# Patient Record
Sex: MALE | Race: WHITE | NOT HISPANIC OR LATINO | Employment: OTHER | URBAN - METROPOLITAN AREA
[De-identification: names, ages, dates, MRNs, and addresses within clinical notes are randomized per-mention and may not be internally consistent; named-entity substitution may affect disease eponyms.]

---

## 2023-09-30 PROBLEM — R33.9 URINARY RETENTION: Status: ACTIVE | Noted: 2023-09-30

## 2023-09-30 PROBLEM — N40.1 BENIGN LOCALIZED HYPERPLASIA OF PROSTATE WITH URINARY RETENTION: Status: ACTIVE | Noted: 2023-09-30

## 2023-09-30 PROBLEM — C61 PROSTATE CANCER (MULTI): Status: ACTIVE | Noted: 2023-09-30

## 2023-09-30 PROBLEM — R33.8 BENIGN LOCALIZED HYPERPLASIA OF PROSTATE WITH URINARY RETENTION: Status: ACTIVE | Noted: 2023-09-30

## 2023-09-30 RX ORDER — LIDOCAINE 50 MG/G
OINTMENT TOPICAL
COMMUNITY
Start: 2022-02-25

## 2023-09-30 RX ORDER — METHOCARBAMOL 500 MG/1
2 TABLET, FILM COATED ORAL 4 TIMES DAILY
COMMUNITY
Start: 2022-01-03

## 2023-09-30 RX ORDER — INDAPAMIDE 1.25 MG/1
1 TABLET ORAL
COMMUNITY
Start: 2021-12-02

## 2023-09-30 RX ORDER — FLUOXETINE HYDROCHLORIDE 20 MG/1
1 CAPSULE ORAL DAILY
COMMUNITY
Start: 2021-06-04

## 2023-09-30 RX ORDER — ACETAMINOPHEN AND CODEINE PHOSPHATE 300; 30 MG/1; MG/1
1 TABLET ORAL EVERY 4 HOURS PRN
COMMUNITY
Start: 2022-02-23

## 2023-09-30 RX ORDER — OMEPRAZOLE 40 MG/1
40 CAPSULE, DELAYED RELEASE ORAL
COMMUNITY

## 2023-09-30 RX ORDER — CARVEDILOL 3.12 MG/1
1 TABLET ORAL 2 TIMES DAILY
COMMUNITY
Start: 2022-02-24

## 2023-09-30 RX ORDER — FLUTICASONE PROPIONATE 50 MCG
1 SPRAY, SUSPENSION (ML) NASAL DAILY
COMMUNITY
Start: 2021-12-21

## 2023-09-30 RX ORDER — QUETIAPINE FUMARATE 25 MG/1
25 TABLET, FILM COATED ORAL NIGHTLY PRN
COMMUNITY
Start: 2022-01-03

## 2023-09-30 RX ORDER — TIMOLOL MALEATE 5 MG/ML
1 SOLUTION/ DROPS OPHTHALMIC 2 TIMES DAILY
COMMUNITY
Start: 2021-02-25

## 2023-09-30 RX ORDER — LISINOPRIL 20 MG/1
1 TABLET ORAL 2 TIMES DAILY
COMMUNITY
Start: 2022-01-31

## 2023-09-30 RX ORDER — LORATADINE 10 MG/1
1 TABLET ORAL DAILY
COMMUNITY
Start: 2021-07-14

## 2023-09-30 RX ORDER — IPRATROPIUM BROMIDE 42 UG/1
SPRAY, METERED NASAL
COMMUNITY
Start: 2022-03-10

## 2023-09-30 RX ORDER — TIZANIDINE 4 MG/1
1-2 TABLET ORAL EVERY 8 HOURS
COMMUNITY
Start: 2021-12-16

## 2023-09-30 RX ORDER — TEMAZEPAM 7.5 MG/1
1 CAPSULE ORAL NIGHTLY
COMMUNITY
Start: 2021-05-21

## 2023-09-30 RX ORDER — TIMOLOL 5 MG/ML
1 SOLUTION/ DROPS OPHTHALMIC 2 TIMES DAILY
COMMUNITY

## 2023-09-30 RX ORDER — QUETIAPINE FUMARATE 50 MG/1
1 TABLET, FILM COATED ORAL NIGHTLY
COMMUNITY
Start: 2022-02-09

## 2023-09-30 RX ORDER — LIDOCAINE HYDROCHLORIDE 20 MG/ML
JELLY TOPICAL
COMMUNITY
Start: 2022-04-12

## 2023-09-30 RX ORDER — CETIRIZINE HYDROCHLORIDE, PSEUDOEPHEDRINE HYDROCHLORIDE 5; 120 MG/1; MG/1
1 TABLET, FILM COATED, EXTENDED RELEASE ORAL 2 TIMES DAILY
COMMUNITY
Start: 2021-12-21

## 2023-09-30 RX ORDER — ROSUVASTATIN CALCIUM 10 MG/1
1 TABLET, COATED ORAL EVERY OTHER DAY
COMMUNITY
Start: 2021-06-17

## 2023-09-30 RX ORDER — ROPINIROLE 0.25 MG/1
0.5 TABLET, FILM COATED ORAL NIGHTLY
COMMUNITY
Start: 2021-05-07

## 2023-09-30 RX ORDER — ESCITALOPRAM OXALATE 5 MG/1
1 TABLET ORAL DAILY
COMMUNITY
Start: 2022-01-03

## 2023-09-30 RX ORDER — TAMSULOSIN HYDROCHLORIDE 0.4 MG/1
0.4 CAPSULE ORAL EVERY 12 HOURS
COMMUNITY
Start: 2022-03-24

## 2023-10-04 ENCOUNTER — APPOINTMENT (OUTPATIENT)
Dept: UROLOGY | Facility: CLINIC | Age: 68
End: 2023-10-04
Payer: MEDICAID

## 2023-11-01 ENCOUNTER — APPOINTMENT (OUTPATIENT)
Dept: UROLOGY | Facility: CLINIC | Age: 68
End: 2023-11-01
Payer: MEDICAID

## 2023-12-15 ENCOUNTER — APPOINTMENT (OUTPATIENT)
Dept: UROLOGY | Facility: CLINIC | Age: 68
End: 2023-12-15
Payer: MEDICAID

## 2024-04-01 ENCOUNTER — APPOINTMENT (OUTPATIENT)
Dept: RADIOLOGY | Facility: CLINIC | Age: 69
End: 2024-04-01
Payer: MEDICARE

## 2024-04-01 ENCOUNTER — APPOINTMENT (OUTPATIENT)
Dept: ORTHOPEDIC SURGERY | Facility: CLINIC | Age: 69
End: 2024-04-01
Payer: MEDICARE

## 2024-04-15 ENCOUNTER — TELEMEDICINE (OUTPATIENT)
Dept: UROLOGY | Facility: CLINIC | Age: 69
End: 2024-04-15
Payer: MEDICARE

## 2024-04-15 ENCOUNTER — APPOINTMENT (OUTPATIENT)
Dept: UROLOGY | Facility: CLINIC | Age: 69
End: 2024-04-15
Payer: MEDICARE

## 2024-04-15 DIAGNOSIS — Z79.2 NEED FOR PROPHYLACTIC ANTIBIOTIC: ICD-10-CM

## 2024-04-15 DIAGNOSIS — R33.8 BENIGN LOCALIZED HYPERPLASIA OF PROSTATE WITH URINARY RETENTION: Primary | ICD-10-CM

## 2024-04-15 DIAGNOSIS — N40.1 BENIGN LOCALIZED HYPERPLASIA OF PROSTATE WITH URINARY RETENTION: Primary | ICD-10-CM

## 2024-04-15 PROCEDURE — 99214 OFFICE O/P EST MOD 30 MIN: CPT | Performed by: UROLOGY

## 2024-04-15 PROCEDURE — G2211 COMPLEX E/M VISIT ADD ON: HCPCS | Performed by: UROLOGY

## 2024-04-15 RX ORDER — CEPHALEXIN 500 MG/1
500 CAPSULE ORAL ONCE
Qty: 1 CAPSULE | Refills: 0 | Status: SHIPPED | OUTPATIENT
Start: 2024-04-15 | End: 2024-04-15

## 2024-04-15 NOTE — PROGRESS NOTES
Subjective     This visit was completed via telemedicine. All issues as below were discussed and addressed but no physical exam was performed unless allowed by visual confirmation. If it was felt that the patient should be evaluated in clinic, then they were directed there. Patient verbally consented to visit.    Paul Malone is a 68 y.o. male with a history of low-grade prostate cancer and BPH s/p HoLEP 05/05/2022. Presenting today for a follow up visit. Patient reports post-void dribbling, occasional urinary hesitancy and occasional intermittent urinary stream. He denies any gross hematuria, dysuria, nausea, vomiting, fevers or chills.         Past Medical History:   Diagnosis Date    Presence of other specified devices 04/12/2022    Chronic indwelling Seo catheter     No past surgical history on file.  No family history on file.  Current Outpatient Medications   Medication Sig Dispense Refill    acetaminophen-codeine (Tylenol w/ Codeine #3) 300-30 mg tablet Take 1 tablet by mouth every 4 hours if needed.      carvedilol (Coreg) 3.125 mg tablet Take 1 tablet (3.125 mg) by mouth 2 times a day.      cetirizine-pseudoephedrine (ZyrTEC-D) 5-120 mg 12 hr tablet Take 1 tablet by mouth 2 times a day. for 14 days      escitalopram (Lexapro) 5 mg tablet Take 1 tablet (5 mg) by mouth once daily.      FLUoxetine (PROzac) 20 mg capsule Take 1 capsule (20 mg) by mouth once daily.      fluticasone (Flonase) 50 mcg/actuation nasal spray Administer 1 spray into each nostril once daily.      indapamide (Lozol) 1.25 mg tablet Take 1 tablet (1.25 mg) by mouth once daily in the morning. Take before meals.      ipratropium (Atrovent) 42 mcg (0.06 %) nasal spray Administer into affected nostril(s).      lidocaine (Xylocaine) 2 % gel Apply topically.  APPLY 0.5 INCH 3 times daily PRN Apply pea size amount to meatus for any discomfort 3 times daily as needed      lidocaine (Xylocaine) 5 % ointment Lidocaine 5 % External Ointment    Quantity: 50  Refills: 0        Start : 25-Feb-2022   Active      linaCLOtide (Linzess) 290 mcg capsule Take 1 capsule (290 mcg) by mouth once daily in the morning. Take before meals.      lisinopril 20 mg tablet Take 1 tablet (20 mg) by mouth 2 times a day.      loratadine (Claritin) 10 mg tablet Take 1 tablet (10 mg) by mouth once daily.      methocarbamol (Robaxin) 500 mg tablet Take 2 tablets (1,000 mg) by mouth 4 times a day. for 5 days      omeprazole (PriLOSEC) 40 mg DR capsule Take 1 capsule (40 mg) by mouth once daily in the morning. Take before meals. Do not crush or chew.      pseudoephedrine-DM-guaifenesin 60- mg tablet Take 1 tablet by mouth 3 times a day as needed.  if needed for congestion      QUEtiapine (SEROquel) 25 mg tablet Take 1 tablet (25 mg) by mouth as needed at bedtime.      QUEtiapine (SEROquel) 50 mg tablet Take 1 tablet (50 mg) by mouth once daily at bedtime.      rOPINIRole (Requip) 0.25 mg tablet Take 2 tablets (0.5 mg) by mouth once daily at bedtime.  1 TO 3 HOURS BEFORE BEDTIME if needed      rosuvastatin (Crestor) 10 mg tablet Take 1 tablet (10 mg) by mouth every other day.      tamsulosin (Flomax) 0.4 mg 24 hr capsule Take 1 capsule (0.4 mg) by mouth every 12 hours.      temazepam (Restoril) 7.5 mg capsule Take 1 capsule (7.5 mg) by mouth once daily at bedtime.      timolol (Timoptic) 0.5 % ophthalmic solution Administer 1 drop into affected eye(s) 2 times a day.      timoloL maleate, PF, 0.5 % dropperette Administer 1 drop into affected eye(s) twice a day.      tiZANidine (Zanaflex) 4 mg tablet Take 1-2 tablets (4-8 mg) by mouth every 8 hours. for 10 days       No current facility-administered medications for this visit.     No Known Allergies  Social History     Socioeconomic History    Marital status:      Spouse name: Not on file    Number of children: Not on file    Years of education: Not on file    Highest education level: Not on file   Occupational History     Not on file   Tobacco Use    Smoking status: Not on file    Smokeless tobacco: Not on file   Substance and Sexual Activity    Alcohol use: Not on file    Drug use: Not on file    Sexual activity: Not on file   Other Topics Concern    Not on file   Social History Narrative    Not on file     Social Determinants of Health     Financial Resource Strain: Not on file   Food Insecurity: Not on file   Transportation Needs: Not on file   Physical Activity: Not on file   Stress: Not on file   Social Connections: Not on file   Intimate Partner Violence: Not on file   Housing Stability: Not on file       Review of Systems  Pertinent items are noted in HPI.    Objective       Lab Review  Lab Results   Component Value Date    WBC 10.4 04/27/2022    RBC 4.71 04/27/2022    HGB 15.1 04/27/2022    HCT 43.9 04/27/2022     04/27/2022      Lab Results   Component Value Date    BUN 12 04/27/2022    CREATININE 0.81 04/27/2022       Assessment/Plan   Diagnoses and all orders for this visit:  Benign localized hyperplasia of prostate with urinary retention  Need for prophylactic antibiotic    BPH with LUTS     I recommend proceeding with cystoscopy to rule out BNC.     Patient reports he currently does not have a drivers licence and has to wait for his brother. He will call to schedule cystoscopy.     The risks of cystoscopy were discussed with the patient in great detail, including risk of hematuria, UTI and discomfort. Antibiotic will be prescribed to be taken 1 hour prior to the procedure.       All questions were answered to the patient's satisfaction. Patient agrees with the plan and wishes to proceed. Follow-up will be scheduled appropriately.     Scribed for Dr. Medrano by Kassandra Sanchez. I , Dr Medrano, have personally reviewed and agreed with the information entered by the Virtual Scribe.

## 2024-05-14 ENCOUNTER — APPOINTMENT (OUTPATIENT)
Dept: UROLOGY | Facility: CLINIC | Age: 69
End: 2024-05-14
Payer: MEDICARE

## 2024-08-27 ENCOUNTER — APPOINTMENT (OUTPATIENT)
Dept: UROLOGY | Facility: CLINIC | Age: 69
End: 2024-08-27
Payer: MEDICARE

## 2024-08-27 DIAGNOSIS — N40.1 BENIGN LOCALIZED HYPERPLASIA OF PROSTATE WITH URINARY RETENTION: ICD-10-CM

## 2024-08-27 DIAGNOSIS — C61 PROSTATE CANCER (MULTI): ICD-10-CM

## 2024-08-27 DIAGNOSIS — R33.8 BENIGN LOCALIZED HYPERPLASIA OF PROSTATE WITH URINARY RETENTION: ICD-10-CM

## 2024-08-27 DIAGNOSIS — Z79.2 NEED FOR PROPHYLACTIC ANTIBIOTIC: ICD-10-CM

## 2024-08-27 PROCEDURE — 99214 OFFICE O/P EST MOD 30 MIN: CPT | Performed by: UROLOGY

## 2024-08-27 PROCEDURE — G2211 COMPLEX E/M VISIT ADD ON: HCPCS | Performed by: UROLOGY

## 2024-08-27 NOTE — PROGRESS NOTES
Subjective     This visit was completed via telemedicine. All issues as below were discussed and addressed but no physical exam was performed unless allowed by visual confirmation. If it was felt that the patient should be evaluated in clinic, then they were directed there. Patient verbally consented to visit.      Paul Malone is a 69 y.o. male  with a history of low-grade prostate cancer and BPH s/p HoLEP 05/05/2022. Presenting today for a follow up visit. Patient reports post-void dribbling, occasional urinary hesitancy and occasional intermittent urinary stream. He denies any gross hematuria, dysuria, nausea, vomiting, fevers or chills.     Last PSA 1.9        Past Medical History:   Diagnosis Date    Presence of other specified devices 04/12/2022    Chronic indwelling Seo catheter     No past surgical history on file.  No family history on file.  Current Outpatient Medications   Medication Sig Dispense Refill    acetaminophen-codeine (Tylenol w/ Codeine #3) 300-30 mg tablet Take 1 tablet by mouth every 4 hours if needed.      carvedilol (Coreg) 3.125 mg tablet Take 1 tablet (3.125 mg) by mouth 2 times a day.      cetirizine-pseudoephedrine (ZyrTEC-D) 5-120 mg 12 hr tablet Take 1 tablet by mouth 2 times a day. for 14 days      escitalopram (Lexapro) 5 mg tablet Take 1 tablet (5 mg) by mouth once daily.      FLUoxetine (PROzac) 20 mg capsule Take 1 capsule (20 mg) by mouth once daily.      fluticasone (Flonase) 50 mcg/actuation nasal spray Administer 1 spray into each nostril once daily.      indapamide (Lozol) 1.25 mg tablet Take 1 tablet (1.25 mg) by mouth once daily in the morning. Take before meals.      ipratropium (Atrovent) 42 mcg (0.06 %) nasal spray Administer into affected nostril(s).      lidocaine (Xylocaine) 2 % gel Apply topically.  APPLY 0.5 INCH 3 times daily PRN Apply pea size amount to meatus for any discomfort 3 times daily as needed      lidocaine (Xylocaine) 5 % ointment Lidocaine 5 %  External Ointment   Quantity: 50  Refills: 0        Start : 25-Feb-2022   Active      linaCLOtide (Linzess) 290 mcg capsule Take 1 capsule (290 mcg) by mouth once daily in the morning. Take before meals.      lisinopril 20 mg tablet Take 1 tablet (20 mg) by mouth 2 times a day.      loratadine (Claritin) 10 mg tablet Take 1 tablet (10 mg) by mouth once daily.      methocarbamol (Robaxin) 500 mg tablet Take 2 tablets (1,000 mg) by mouth 4 times a day. for 5 days      omeprazole (PriLOSEC) 40 mg DR capsule Take 1 capsule (40 mg) by mouth once daily in the morning. Take before meals. Do not crush or chew.      pseudoephedrine-DM-guaifenesin 60- mg tablet Take 1 tablet by mouth 3 times a day as needed.  if needed for congestion      QUEtiapine (SEROquel) 25 mg tablet Take 1 tablet (25 mg) by mouth as needed at bedtime.      QUEtiapine (SEROquel) 50 mg tablet Take 1 tablet (50 mg) by mouth once daily at bedtime.      rOPINIRole (Requip) 0.25 mg tablet Take 2 tablets (0.5 mg) by mouth once daily at bedtime.  1 TO 3 HOURS BEFORE BEDTIME if needed      rosuvastatin (Crestor) 10 mg tablet Take 1 tablet (10 mg) by mouth every other day.      tamsulosin (Flomax) 0.4 mg 24 hr capsule Take 1 capsule (0.4 mg) by mouth every 12 hours.      temazepam (Restoril) 7.5 mg capsule Take 1 capsule (7.5 mg) by mouth once daily at bedtime.      timolol (Timoptic) 0.5 % ophthalmic solution Administer 1 drop into affected eye(s) 2 times a day.      timoloL maleate, PF, 0.5 % dropperette Administer 1 drop into affected eye(s) twice a day.      tiZANidine (Zanaflex) 4 mg tablet Take 1-2 tablets (4-8 mg) by mouth every 8 hours. for 10 days       No current facility-administered medications for this visit.     No Known Allergies  Social History     Socioeconomic History    Marital status:      Spouse name: Not on file    Number of children: Not on file    Years of education: Not on file    Highest education level: Not on file  "  Occupational History    Not on file   Tobacco Use    Smoking status: Not on file    Smokeless tobacco: Not on file   Substance and Sexual Activity    Alcohol use: Not on file    Drug use: Not on file    Sexual activity: Not on file   Other Topics Concern    Not on file   Social History Narrative    Not on file     Social Determinants of Health     Financial Resource Strain: Not on file   Food Insecurity: Not on file   Transportation Needs: Not on file   Physical Activity: Not on file   Stress: Not on file   Social Connections: Not on file   Intimate Partner Violence: Not on file   Housing Stability: Not on file       Review of Systems  Pertinent items are noted in HPI.    Objective       Lab Review  Lab Results   Component Value Date    WBC 10.4 04/27/2022    RBC 4.71 04/27/2022    HGB 15.1 04/27/2022    HCT 43.9 04/27/2022     04/27/2022      Lab Results   Component Value Date    BUN 12 04/27/2022    CREATININE 0.81 04/27/2022      No results found for: \"PSA\"        Assessment/Plan   There are no diagnoses linked to this encounter.  BPH with LUTS s/p HoLEP    I recommend proceeding with cystoscopy to rule out BNC.     The risks of cystoscopy were discussed with the patient in great detail, including risk of hematuria, UTI and discomfort. Antibiotic will be prescribed to be taken 1 hour prior to the procedure. Patient agrees to proceed.     Elevated PSA     PSA is 1.9, this is elevated.     We will obtain a prostate MRI to assess prostate anatomy and r/o prostate cancer.     Follow up to review.     All questions were answered to the patient's satisfaction. Patient agrees with the plan and wishes to proceed. Follow-up will be scheduled appropriately.       Scribed for Dr. Medrano by Kassandra Sanchez. I , Dr Medrano, have personally reviewed and agreed with the information entered by the Virtual Scribe.   "

## 2024-09-12 ENCOUNTER — HOSPITAL ENCOUNTER (OUTPATIENT)
Dept: RADIOLOGY | Facility: CLINIC | Age: 69
Discharge: HOME | End: 2024-09-12
Payer: COMMERCIAL

## 2024-09-12 DIAGNOSIS — C61 PROSTATE CANCER (MULTI): ICD-10-CM

## 2024-09-12 PROCEDURE — 72197 MRI PELVIS W/O & W/DYE: CPT

## 2024-09-12 PROCEDURE — A9575 INJ GADOTERATE MEGLUMI 0.1ML: HCPCS | Performed by: UROLOGY

## 2024-09-12 PROCEDURE — 2550000001 HC RX 255 CONTRASTS: Performed by: UROLOGY

## 2024-09-12 RX ORDER — GADOTERATE MEGLUMINE 376.9 MG/ML
0.2 INJECTION INTRAVENOUS
Status: COMPLETED | OUTPATIENT
Start: 2024-09-12 | End: 2024-09-12

## 2024-09-18 ENCOUNTER — TELEMEDICINE (OUTPATIENT)
Dept: UROLOGY | Facility: CLINIC | Age: 69
End: 2024-09-18
Payer: MEDICARE

## 2024-09-18 DIAGNOSIS — R33.8 BENIGN LOCALIZED HYPERPLASIA OF PROSTATE WITH URINARY RETENTION: ICD-10-CM

## 2024-09-18 DIAGNOSIS — C61 PROSTATE CANCER (MULTI): ICD-10-CM

## 2024-09-18 DIAGNOSIS — N40.1 BENIGN LOCALIZED HYPERPLASIA OF PROSTATE WITH URINARY RETENTION: ICD-10-CM

## 2024-09-18 NOTE — PROGRESS NOTES
Subjective     This visit was completed via telemedicine. All issues as below were discussed and addressed but no physical exam was performed unless allowed by visual confirmation. If it was felt that the patient should be evaluated in clinic, then they were directed there. Patient verbally consented to visit.      Paul Malone is a 69 y.o. male with a history of low-grade prostate cancer, elevated PSA and BPH s/p HoLEP 05/05/2022. Presenting today for a follow up visit to review prostate MRI. Patient has complaints of post-void dribbling, occasional urinary hesitancy and occasional intermittent urinary stream. He denies any gross hematuria, dysuria, nausea, vomiting, fevers or chills.       Past Medical History:   Diagnosis Date    Presence of other specified devices 04/12/2022    Chronic indwelling Seo catheter     No past surgical history on file.  No family history on file.  Current Outpatient Medications   Medication Sig Dispense Refill    acetaminophen-codeine (Tylenol w/ Codeine #3) 300-30 mg tablet Take 1 tablet by mouth every 4 hours if needed.      carvedilol (Coreg) 3.125 mg tablet Take 1 tablet (3.125 mg) by mouth 2 times a day.      cetirizine-pseudoephedrine (ZyrTEC-D) 5-120 mg 12 hr tablet Take 1 tablet by mouth 2 times a day. for 14 days      escitalopram (Lexapro) 5 mg tablet Take 1 tablet (5 mg) by mouth once daily.      FLUoxetine (PROzac) 20 mg capsule Take 1 capsule (20 mg) by mouth once daily.      fluticasone (Flonase) 50 mcg/actuation nasal spray Administer 1 spray into each nostril once daily.      indapamide (Lozol) 1.25 mg tablet Take 1 tablet (1.25 mg) by mouth once daily in the morning. Take before meals.      ipratropium (Atrovent) 42 mcg (0.06 %) nasal spray Administer into affected nostril(s).      lidocaine (Xylocaine) 2 % gel Apply topically.  APPLY 0.5 INCH 3 times daily PRN Apply pea size amount to meatus for any discomfort 3 times daily as needed      lidocaine (Xylocaine) 5  % ointment Lidocaine 5 % External Ointment   Quantity: 50  Refills: 0        Start : 25-Feb-2022   Active      linaCLOtide (Linzess) 290 mcg capsule Take 1 capsule (290 mcg) by mouth once daily in the morning. Take before meals.      lisinopril 20 mg tablet Take 1 tablet (20 mg) by mouth 2 times a day.      loratadine (Claritin) 10 mg tablet Take 1 tablet (10 mg) by mouth once daily.      methocarbamol (Robaxin) 500 mg tablet Take 2 tablets (1,000 mg) by mouth 4 times a day. for 5 days      omeprazole (PriLOSEC) 40 mg DR capsule Take 1 capsule (40 mg) by mouth once daily in the morning. Take before meals. Do not crush or chew.      pseudoephedrine-DM-guaifenesin 60- mg tablet Take 1 tablet by mouth 3 times a day as needed.  if needed for congestion      QUEtiapine (SEROquel) 25 mg tablet Take 1 tablet (25 mg) by mouth as needed at bedtime.      QUEtiapine (SEROquel) 50 mg tablet Take 1 tablet (50 mg) by mouth once daily at bedtime.      rOPINIRole (Requip) 0.25 mg tablet Take 2 tablets (0.5 mg) by mouth once daily at bedtime.  1 TO 3 HOURS BEFORE BEDTIME if needed      rosuvastatin (Crestor) 10 mg tablet Take 1 tablet (10 mg) by mouth every other day.      tamsulosin (Flomax) 0.4 mg 24 hr capsule Take 1 capsule (0.4 mg) by mouth every 12 hours.      temazepam (Restoril) 7.5 mg capsule Take 1 capsule (7.5 mg) by mouth once daily at bedtime.      timolol (Timoptic) 0.5 % ophthalmic solution Administer 1 drop into affected eye(s) 2 times a day.      timoloL maleate, PF, 0.5 % dropperette Administer 1 drop into affected eye(s) twice a day.      tiZANidine (Zanaflex) 4 mg tablet Take 1-2 tablets (4-8 mg) by mouth every 8 hours. for 10 days       No current facility-administered medications for this visit.     No Known Allergies  Social History     Socioeconomic History    Marital status:      Spouse name: Not on file    Number of children: Not on file    Years of education: Not on file    Highest education  "level: Not on file   Occupational History    Not on file   Tobacco Use    Smoking status: Every Day     Current packs/day: 1.00     Types: Cigarettes    Smokeless tobacco: Never   Substance and Sexual Activity    Alcohol use: Not on file    Drug use: Not on file    Sexual activity: Not on file   Other Topics Concern    Not on file   Social History Narrative    Not on file     Social Determinants of Health     Financial Resource Strain: Not on file   Food Insecurity: Not on file   Transportation Needs: Not on file   Physical Activity: Not on file   Stress: Not on file   Social Connections: Not on file   Intimate Partner Violence: Not on file   Housing Stability: Not on file       Review of Systems  Pertinent items are noted in HPI.    Objective       Lab Review  Lab Results   Component Value Date    WBC 10.4 04/27/2022    RBC 4.71 04/27/2022    HGB 15.1 04/27/2022    HCT 43.9 04/27/2022     04/27/2022      Lab Results   Component Value Date    BUN 12 04/27/2022    CREATININE 0.81 04/27/2022      No results found for: \"PSA\"        Assessment/Plan   Diagnoses and all orders for this visit:  Prostate cancer (Multi)  Benign localized hyperplasia of prostate with urinary retention  Elevated PSA   BPH with LUTS s/p HoLEP  History of low-grade prostate cancer,    I reviewed prostate MRI from 9/12/2024 which showed a 18.9g prostate with a PI-RADS 4 lesion in the left posterior apicalperipheral zone. No definite signs of gross extracapsular extension. No evidence of enlarged pelvic lymph nodes.    I recommend proceeding with a fusion guided prostate. I reviewed with him the risks of fusion guided prostate biopsy which include hematuria, rectal bleeding, UTI, urosepsis, urinary retention, discomfort, and missing prostate cancer diagnosis. Antibiotic will be administered prior to the procedure to minimize those risks. This will be scheduled with Dr. Jolly under sedation.     We will schedule the biopsy after we complete " cystoscopy to r/o [facility].     The risks of cystoscopy were discussed with the patient in great detail, including risk of hematuria, UTI and discomfort. Antibiotic will be prescribed to be taken 1 hour prior to the procedure. Patient agrees to proceed.     All questions were answered to the patient's satisfaction. Patient agrees with the plan and wishes to proceed. Follow-up will be scheduled appropriately.     E&M visit today is associated with current or anticipated ongoing medical care services related to a patient's single, serious condition or a complex condition.    Scribed for Dr. Medrano by Kassandra Sanchez. I , Dr Medrano, have personally reviewed and agreed with the information entered by the Virtual Scribe.

## 2024-09-24 ENCOUNTER — APPOINTMENT (OUTPATIENT)
Dept: UROLOGY | Facility: CLINIC | Age: 69
End: 2024-09-24
Payer: MEDICARE

## 2024-09-25 ENCOUNTER — APPOINTMENT (OUTPATIENT)
Dept: UROLOGY | Facility: CLINIC | Age: 69
End: 2024-09-25
Payer: MEDICARE

## 2024-10-14 NOTE — PROGRESS NOTES
Subjective   Paul Malone is a 69 y.o. male with a history of low-grade prostate cancer, elevated PSA, BPH s/p HoLEP 05/05/2022 and PI-RADS 4 lesion in the left posterior apicalperipheral zone on MRI (9/12/2024). During his last visit patient had complaints of post-void dribbling, occasional urinary hesitancy and occasional intermittent urinary stream. Presenting today for cystoscopy to r/o [facility]      Past Medical History:   Diagnosis Date    Presence of other specified devices 04/12/2022    Chronic indwelling Seo catheter     No past surgical history on file.  No family history on file.  Current Outpatient Medications   Medication Sig Dispense Refill    acetaminophen-codeine (Tylenol w/ Codeine #3) 300-30 mg tablet Take 1 tablet by mouth every 4 hours if needed.      carvedilol (Coreg) 3.125 mg tablet Take 1 tablet (3.125 mg) by mouth 2 times a day.      cetirizine-pseudoephedrine (ZyrTEC-D) 5-120 mg 12 hr tablet Take 1 tablet by mouth 2 times a day. for 14 days      escitalopram (Lexapro) 5 mg tablet Take 1 tablet (5 mg) by mouth once daily.      FLUoxetine (PROzac) 20 mg capsule Take 1 capsule (20 mg) by mouth once daily.      fluticasone (Flonase) 50 mcg/actuation nasal spray Administer 1 spray into each nostril once daily.      indapamide (Lozol) 1.25 mg tablet Take 1 tablet (1.25 mg) by mouth once daily in the morning. Take before meals.      ipratropium (Atrovent) 42 mcg (0.06 %) nasal spray Administer into affected nostril(s).      lidocaine (Xylocaine) 2 % gel Apply topically.  APPLY 0.5 INCH 3 times daily PRN Apply pea size amount to meatus for any discomfort 3 times daily as needed      lidocaine (Xylocaine) 5 % ointment Lidocaine 5 % External Ointment   Quantity: 50  Refills: 0        Start : 25-Feb-2022   Active      linaCLOtide (Linzess) 290 mcg capsule Take 1 capsule (290 mcg) by mouth once daily in the morning. Take before meals.      lisinopril 20 mg tablet Take 1 tablet (20 mg) by mouth 2 times  a day.      loratadine (Claritin) 10 mg tablet Take 1 tablet (10 mg) by mouth once daily.      methocarbamol (Robaxin) 500 mg tablet Take 2 tablets (1,000 mg) by mouth 4 times a day. for 5 days      omeprazole (PriLOSEC) 40 mg DR capsule Take 1 capsule (40 mg) by mouth once daily in the morning. Take before meals. Do not crush or chew.      pseudoephedrine-DM-guaifenesin 60- mg tablet Take 1 tablet by mouth 3 times a day as needed.  if needed for congestion      QUEtiapine (SEROquel) 25 mg tablet Take 1 tablet (25 mg) by mouth as needed at bedtime.      QUEtiapine (SEROquel) 50 mg tablet Take 1 tablet (50 mg) by mouth once daily at bedtime.      rOPINIRole (Requip) 0.25 mg tablet Take 2 tablets (0.5 mg) by mouth once daily at bedtime.  1 TO 3 HOURS BEFORE BEDTIME if needed      rosuvastatin (Crestor) 10 mg tablet Take 1 tablet (10 mg) by mouth every other day.      tamsulosin (Flomax) 0.4 mg 24 hr capsule Take 1 capsule (0.4 mg) by mouth every 12 hours.      temazepam (Restoril) 7.5 mg capsule Take 1 capsule (7.5 mg) by mouth once daily at bedtime.      timolol (Timoptic) 0.5 % ophthalmic solution Administer 1 drop into affected eye(s) 2 times a day.      timoloL maleate, PF, 0.5 % dropperette Administer 1 drop into affected eye(s) twice a day.      tiZANidine (Zanaflex) 4 mg tablet Take 1-2 tablets (4-8 mg) by mouth every 8 hours. for 10 days       No current facility-administered medications for this visit.     No Known Allergies  Social History     Socioeconomic History    Marital status:      Spouse name: Not on file    Number of children: Not on file    Years of education: Not on file    Highest education level: Not on file   Occupational History    Not on file   Tobacco Use    Smoking status: Every Day     Current packs/day: 1.00     Types: Cigarettes    Smokeless tobacco: Never   Substance and Sexual Activity    Alcohol use: Not on file    Drug use: Not on file    Sexual activity: Not on file    Other Topics Concern    Not on file   Social History Narrative    Not on file     Social Determinants of Health     Financial Resource Strain: Not on file   Food Insecurity: Not on file   Transportation Needs: Not on file   Physical Activity: Not on file   Stress: Not on file   Social Connections: Not on file   Intimate Partner Violence: Not on file   Housing Stability: Not on file       Review of Systems  Pertinent items are noted in HPI.    Objective   Cystoscopy performed:   Paul Malone identified using two (2) forms of identification.  Procedure: diagnostic cystourethroscopy  Indications for procedure: BPH with LUTS s/p HoLEP  Risks, benefits, and alternatives were discussed in detail.   Patient appears to understand and agrees to proceed.   Patient has signed the procedure consent form.     Cystoscopy findings:  Urethra: normal course and caliber, no evidence of stricture or lesion.  Prostate: widely open channel with no residual adenoma.  Bladder: normal capacity, no trabeculations, no diverticulum, no stone, tumors or other lesions.  Post-cystoscopy: Patient tolerated procedure without complications.    [] Lateral hypertrophy, with complete channel occlusion.  [] Obstructing median lobe with intravesical protrusion.  [] Good sphincter coaptation.  [x] Normal bladder.           Lab Review  Lab Results   Component Value Date    WBC 10.4 04/27/2022    RBC 4.71 04/27/2022    HGB 15.1 04/27/2022    HCT 43.9 04/27/2022     04/27/2022      Lab Results   Component Value Date    BUN 12 04/27/2022    CREATININE 0.81 04/27/2022          Assessment/Plan   There are no diagnoses linked to this encounter.      BPH with LUTS s/p HoLEP    Cystoscopy today showed normal bladder and widely open channel with no residual adenoma.    Patient likely has bladder dysfunction.     Elevated PSA   History of low-grade prostate cancer  PI-RADS 4 lesion    Prostate MRI from 9/12/2024 which showed a 18.9g prostate with a  PI-RADS 4 lesion in the left posterior apicalperipheral zone. No definite signs of gross extracapsular extension. No evidence of enlarged pelvic lymph nodes.     I recommend proceeding with a fusion guided prostate. I reviewed with him the risks of fusion guided prostate biopsy which include hematuria, rectal bleeding, UTI, urosepsis, urinary retention, discomfort, and missing prostate cancer diagnosis. Antibiotic will be administered prior to the procedure to minimize those risks. This will be scheduled with Dr. Jolly under sedation.       All questions were answered to the patient's satisfaction. Patient agrees with the plan and wishes to proceed. Follow-up will be scheduled appropriately.     E&M visit today is associated with current or anticipated ongoing medical care services related to a patient's single, serious condition or a complex condition.    I spent 40 minutes of dedicated E&M time, including preparation and review of records, notes, and data, time spent with patient/family, and documentation.     Scribed for Dr. Medrano by Kassandra Sanchez. I , Dr Medrano, have personally reviewed and agreed with the information entered by the Virtual Scribe.

## 2024-10-15 ENCOUNTER — APPOINTMENT (OUTPATIENT)
Dept: UROLOGY | Facility: CLINIC | Age: 69
End: 2024-10-15
Payer: MEDICARE

## 2024-10-15 VITALS
TEMPERATURE: 97.5 F | DIASTOLIC BLOOD PRESSURE: 90 MMHG | BODY MASS INDEX: 24.34 KG/M2 | HEIGHT: 70 IN | SYSTOLIC BLOOD PRESSURE: 148 MMHG | WEIGHT: 170 LBS | HEART RATE: 76 BPM

## 2024-10-15 DIAGNOSIS — C61 PROSTATE CANCER (MULTI): ICD-10-CM

## 2024-10-15 DIAGNOSIS — R33.8 BENIGN LOCALIZED HYPERPLASIA OF PROSTATE WITH URINARY RETENTION: Primary | ICD-10-CM

## 2024-10-15 DIAGNOSIS — N40.1 BENIGN LOCALIZED HYPERPLASIA OF PROSTATE WITH URINARY RETENTION: Primary | ICD-10-CM

## 2024-10-15 PROCEDURE — 99215 OFFICE O/P EST HI 40 MIN: CPT | Performed by: UROLOGY

## 2024-10-15 PROCEDURE — 52000 CYSTOURETHROSCOPY: CPT | Performed by: UROLOGY

## 2024-10-15 RX ORDER — CEFAZOLIN SODIUM 2 G/100ML
2 INJECTION, SOLUTION INTRAVENOUS ONCE
OUTPATIENT
Start: 2024-10-15 | End: 2024-10-15

## 2024-10-15 ASSESSMENT — PAIN SCALES - GENERAL: PAINLEVEL: 0-NO PAIN

## 2024-10-16 DIAGNOSIS — C61 PROSTATE CANCER (MULTI): Primary | ICD-10-CM

## 2024-10-21 ENCOUNTER — APPOINTMENT (OUTPATIENT)
Dept: UROLOGY | Facility: CLINIC | Age: 69
End: 2024-10-21
Payer: MEDICARE

## 2024-10-22 ENCOUNTER — LAB (OUTPATIENT)
Dept: LAB | Facility: LAB | Age: 69
End: 2024-10-22
Payer: COMMERCIAL

## 2024-10-22 ENCOUNTER — PRE-ADMISSION TESTING (OUTPATIENT)
Dept: PREADMISSION TESTING | Facility: HOSPITAL | Age: 69
End: 2024-10-22
Payer: COMMERCIAL

## 2024-10-22 VITALS
HEIGHT: 71 IN | HEART RATE: 79 BPM | BODY MASS INDEX: 23.09 KG/M2 | DIASTOLIC BLOOD PRESSURE: 74 MMHG | RESPIRATION RATE: 16 BRPM | SYSTOLIC BLOOD PRESSURE: 121 MMHG | OXYGEN SATURATION: 100 % | TEMPERATURE: 97.1 F | WEIGHT: 164.9 LBS

## 2024-10-22 DIAGNOSIS — Z01.818 PREOP TESTING: Primary | ICD-10-CM

## 2024-10-22 DIAGNOSIS — R35.0 FREQUENCY OF URINATION: ICD-10-CM

## 2024-10-22 DIAGNOSIS — C61 PROSTATE CANCER (MULTI): ICD-10-CM

## 2024-10-22 DIAGNOSIS — Z01.818 PREOP TESTING: ICD-10-CM

## 2024-10-22 LAB
ANION GAP SERPL CALC-SCNC: 13 MMOL/L (ref 10–20)
BUN SERPL-MCNC: 16 MG/DL (ref 6–23)
CALCIUM SERPL-MCNC: 9.6 MG/DL (ref 8.6–10.3)
CHLORIDE SERPL-SCNC: 101 MMOL/L (ref 98–107)
CO2 SERPL-SCNC: 27 MMOL/L (ref 21–32)
CREAT SERPL-MCNC: 0.74 MG/DL (ref 0.5–1.3)
EGFRCR SERPLBLD CKD-EPI 2021: >90 ML/MIN/1.73M*2
GLUCOSE SERPL-MCNC: 86 MG/DL (ref 74–99)
POTASSIUM SERPL-SCNC: 5 MMOL/L (ref 3.5–5.3)
SODIUM SERPL-SCNC: 136 MMOL/L (ref 136–145)

## 2024-10-22 PROCEDURE — 99204 OFFICE O/P NEW MOD 45 MIN: CPT

## 2024-10-22 PROCEDURE — 93010 ELECTROCARDIOGRAM REPORT: CPT | Performed by: INTERNAL MEDICINE

## 2024-10-22 PROCEDURE — 80048 BASIC METABOLIC PNL TOTAL CA: CPT

## 2024-10-22 PROCEDURE — 93005 ELECTROCARDIOGRAM TRACING: CPT

## 2024-10-22 RX ORDER — NAPROXEN 250 MG/1
500 TABLET ORAL 2 TIMES DAILY PRN
COMMUNITY

## 2024-10-22 RX ORDER — ASPIRIN 81 MG/1
81 TABLET ORAL DAILY
COMMUNITY

## 2024-10-22 RX ORDER — TRAZODONE HYDROCHLORIDE 50 MG/1
50 TABLET ORAL NIGHTLY PRN
COMMUNITY

## 2024-10-22 RX ORDER — HYDROCODONE BITARTRATE AND ACETAMINOPHEN 5; 325 MG/1; MG/1
1 TABLET ORAL EVERY 6 HOURS PRN
COMMUNITY

## 2024-10-22 ASSESSMENT — DUKE ACTIVITY SCORE INDEX (DASI)
CAN YOU CLIMB A FLIGHT OF STAIRS OR WALK UP A HILL: YES
CAN YOU DO LIGHT WORK AROUND THE HOUSE LIKE DUSTING OR WASHING DISHES: YES
CAN YOU HAVE SEXUAL RELATIONS: YES
CAN YOU WALK A BLOCK OR TWO ON LEVEL GROUND: YES
TOTAL_SCORE: 34.7
CAN YOU WALK INDOORS, SUCH AS AROUND YOUR HOUSE: YES
CAN YOU TAKE CARE OF YOURSELF (EAT, DRESS, BATHE, OR USE TOILET): YES
CAN YOU DO MODERATE WORK AROUND THE HOUSE LIKE VACUUMING, SWEEPING FLOORS OR CARRYING GROCERIES: YES
DASI METS SCORE: 7
CAN YOU PARTICIPATE IN STRENOUS SPORTS LIKE SWIMMING, SINGLES TENNIS, FOOTBALL, BASKETBALL, OR SKIING: NO
CAN YOU PARTICIPATE IN MODERATE RECREATIONAL ACTIVITIES LIKE GOLF, BOWLING, DANCING, DOUBLES TENNIS OR THROWING A BASEBALL OR FOOTBALL: YES
CAN YOU DO YARD WORK LIKE RAKING LEAVES, WEEDING OR PUSHING A MOWER: YES
CAN YOU DO HEAVY WORK AROUND THE HOUSE LIKE SCRUBBING FLOORS OR LIFTING AND MOVING HEAVY FURNITURE: NO
CAN YOU RUN A SHORT DISTANCE: NO

## 2024-10-22 ASSESSMENT — PAIN DESCRIPTION - DESCRIPTORS: DESCRIPTORS: ACHING

## 2024-10-22 ASSESSMENT — PAIN - FUNCTIONAL ASSESSMENT: PAIN_FUNCTIONAL_ASSESSMENT: 0-10

## 2024-10-22 ASSESSMENT — PAIN SCALES - GENERAL: PAINLEVEL_OUTOF10: 7

## 2024-10-22 NOTE — CPM/PAT H&P
CPM/PAT Evaluation       Name: Paul Malone (Paul Malone)  /Age: 1955/69 y.o.     In-Person       Chief Complaint: Prostate cancer    HPI  Patient is an alert and oriented 69 year old male scheduled for a  prostate biopsy on 2024 with Dr. Jolly for prostate cancer. He endorses urinary frequency, urgency, and nocturia without dysuria, hematuria, fevers, chills, or myalgias. PMHX includes HTN, CAD, COPD, RLS, and prostate cancer. Presents to [facility] today for perioperative risk stratification and optimization.     Past Medical History:   Diagnosis Date    CAD (coronary artery disease)     COPD (chronic obstructive pulmonary disease) (Multi)     Fatty liver     HTN (hypertension)     Presence of other specified devices 2022    Chronic indwelling Seo catheter    Pulmonary embolism     RLS (restless legs syndrome)      Past Surgical History:   Procedure Laterality Date    PROSTATE SURGERY      SPINE SURGERY       Patient  has no history on file for sexual activity.    No family history on file.    No Known Allergies    Medication Documentation Review Audit       Reviewed by Brea Cleary RN (Registered Nurse) on 10/22/24 at 1426      Medication Order Taking? Sig Documenting Provider Last Dose Status   acetaminophen-codeine (Tylenol w/ Codeine #3) 300-30 mg tablet [id number] Yes Take 1 tablet by mouth every 4 hours if needed. Historical Provider, MD Past Month Active   aspirin 81 mg EC tablet [id number] Yes Take 1 tablet (81 mg) by mouth once daily. Shey Golden MD 10/22/2024 Active   carvedilol (Coreg) 3.125 mg tablet [id number] Yes Take 1 tablet (3.125 mg) by mouth 2 times a day. Shey Golden MD 10/22/2024 Active     Discontinued 10/22/24 1413     Discontinued 10/22/24 1413     Discontinued 10/22/24 1414   fluticasone (Flonase) 50 mcg/actuation nasal spray [id number]  Administer 1 spray into each nostril once daily as needed. Historical Provider, MD  Active    HYDROcodone-acetaminophen (Norco) 5-325 mg tablet [id number] Yes Take 1 tablet by mouth every 6 hours if needed for severe pain (7 - 10). Shey Golden MD Past Week Active   indapamide (Lozol) 1.25 mg tablet [id number] No Take 1 tablet (1.25 mg) by mouth once daily in the morning. Take before meals.   Patient not taking: Reported on 10/22/2024    Shey Golden MD Not Taking Active     Discontinued 10/22/24 1414   lidocaine (Xylocaine) 2 % gel [id number] No Apply topically.  APPLY 0.5 INCH 3 times daily PRN Apply pea size amount to meatus for any discomfort 3 times daily as needed   Patient not taking: Reported on 10/22/2024    Shey Golden MD More than a month Active   lidocaine (Xylocaine) 5 % ointment [id number] No Lidocaine 5 % External Ointment   Quantity: 50  Refills: 0        Start : 25-Feb-2022   Active   Patient not taking: Reported on 10/22/2024    Shey Golden MD More than a month Active     Discontinued 10/22/24 1415   lisinopril 20 mg tablet [id number] Yes Take 1 tablet (20 mg) by mouth once daily. Shey Golden MD 10/22/2024 Active     Discontinued 10/22/24 1416     Discontinued 10/22/24 1416   naproxen (Naprosyn) 250 mg tablet [id number] Yes Take 2 tablets (500 mg) by mouth 2 times a day as needed for mild pain (1 - 3). Historical MD Chantel Past Week Active     Discontinued 10/22/24 1416   pseudoephedrine-DM-guaifenesin 60- mg tablet [id number] No Take 1 tablet by mouth 3 times a day as needed.  if needed for congestion   Patient not taking: Reported on 10/22/2024    Shey Golden MD Not Taking Active     Discontinued 10/22/24 1417     Discontinued 10/22/24 1417   rOPINIRole (Requip) 0.25 mg tablet [id number] Yes Take 2 tablets (0.5 mg) by mouth once daily at bedtime.  1 TO 3 HOURS BEFORE BEDTIME if needed Historical MD Chantel 10/21/2024 Active     Discontinued 10/22/24 1417     Discontinued 10/22/24 1418     Discontinued 10/22/24 1418   timolol  "(Timoptic) 0.5 % ophthalmic solution [id number] Yes Administer 1 drop into both eyes 2 times a day. Historical Provider, MD 10/22/2024 Active     Discontinued 10/22/24 1418   tiZANidine (Zanaflex) 4 mg tablet [id number] No Take 1-2 tablets (4-8 mg) by mouth every 8 hours. for 10 days   Patient not taking: Reported on 10/22/2024    Historical Provider, MD Not Taking Active   traZODone (Desyrel) 50 mg tablet [id number] Yes Take 1 tablet (50 mg) by mouth as needed at bedtime for sleep. Historical Provider, MD 10/21/2024 Active                   Visit Vitals  /74   Pulse 79   Temp 36.2 °C (97.1 °F) (Temporal)   Resp 16   Ht 1.803 m (5' 11\")   Wt 74.8 kg (164 lb 14.5 oz)   SpO2 100%   BMI 23.00 kg/m²   Smoking Status Every Day   BSA 1.94 m²        Review of Systems   Constitutional: Negative for chills, decreased appetite, diaphoresis, fever and malaise/fatigue.   Eyes:  Negative for blurred vision and double vision.   Cardiovascular:  Negative for claudication, cyanosis, irregular heartbeat, leg swelling, near-syncope and palpitations. Positive for dyspnea on exertion and chest pain intermittently  Respiratory:  Negative for cough, hemoptysis, and wheezing. Positive for shortness of breath  Endocrine: Negative for cold intolerance, heat intolerance, polydipsia, polyphagia and polyuria.   Gastrointestinal:  Negative for abdominal pain, constipation, diarrhea, dysphagia, nausea and vomiting.   Genitourinary:  Negative for bladder incontinence, dysuria, hematuria, incomplete emptying, pelvic pain. Positive for frequency, urgency, and nocturia.  Neurological:  Negative for headaches, light-headedness, paresthesias, sensory change and weakness.   Psychiatric/Behavioral:  Negative for altered mental status.    Musculoskeletal: Negative for myalgias, arthralgias     Vitals and nursing note reviewed.     Physical exam  Constitutional:       Appearance: Normal appearance. He is normal weight  HENT:      Head: Normocephalic " and atraumatic.      Mouth/Throat:      Mouth: Mucous membranes are moist.      Pharynx: Oropharynx is clear.   Eyes:      Extraocular Movements: Extraocular movements intact.      Conjunctiva/sclera: Conjunctivae normal.      Pupils: Pupils are equal, round, and reactive to light.   Cardiovascular:      PMI at left midclavicular line. Normal rate. Regular rhythm. Normal S1. Normal S2.       Murmurs: There is no murmur.      No gallop.  No click. No rub.       No audible carotid bruit     No lower extremity edema on exam  Pulmonary:      Effort: Pulmonary effort is normal.      Breath sounds: Normal breath sounds.   Abdominal:      General: Abdomen is flat. Bowel sounds are normal.      Palpations: Abdomen is soft and non-tender  Musculoskeletal:      Cervical back: Normal range of motion and neck supple.   Skin:     General: Skin is warm and dry.      Capillary Refill: Capillary refill takes less than 2 seconds.   Neurological:      General: No focal deficit present.      Mental Status: He is alert and oriented to person, place, and time. Mental status is at baseline.   Psychiatric:         Mood and Affect: Mood normal.         Behavior: Behavior normal.         Thought Content: Thought content normal.         Judgment: Judgment normal.     Vitals and nursing note reviewed. Physical exam within normal limits.     DASI Risk Score      Flowsheet Row Pre-Admission Testing from 10/22/2024 in [facility]   Can you take care of yourself (eat, dress, bathe, or use toilet)?  2.75 filed at 10/22/2024 1503   Can you walk indoors, such as around your house? 1.75 filed at 10/22/2024 1503   Can you walk a block or two on level ground?  2.75 filed at 10/22/2024 1503   Can you climb a flight of stairs or walk up a hill? 5.5 filed at 10/22/2024 1503   Can you run a short distance? 0 filed at 10/22/2024 1503   Can you do light work around the house like dusting or washing dishes? 2.7 filed at 10/22/2024 1503   Can  you do moderate work around the house like vacuuming, sweeping floors or carrying groceries? 3.5 filed at 10/22/2024 1503   Can you do heavy work around the house like scrubbing floors or lifting and moving heavy furniture?  0 filed at 10/22/2024 1503   Can you do yard work like raking leaves, weeding or pushing a mower? 4.5 filed at 10/22/2024 1503   Can you have sexual relations? 5.25 filed at 10/22/2024 1503   Can you participate in moderate recreational activities like golf, bowling, dancing, doubles tennis or throwing a baseball or football? 6 filed at 10/22/2024 1503   Can you participate in strenous sports like swimming, singles tennis, football, basketball, or skiing? 0 filed at 10/22/2024 1503   DASI SCORE 34.7 filed at 10/22/2024 1503   METS Score (Will be calculated only when all the questions are answered) 7 filed at 10/22/2024 1503          Caprini DVT Assessment      Flowsheet Row Pre-Admission Testing from 10/22/2024 in [facility]   DVT Score 8 filed at 10/22/2024 1502   Medical Factors History of DVT/PE, COPD filed at 10/22/2024 1502   Surgical Factors Minor surgery planned filed at 10/22/2024 1502   BMI 30 or less filed at 10/22/2024 1502          Modified Frailty Index    No data to display       CHADS2 Stroke Risk  Current as of 13 minutes ago        N/A 3 to 100%: High Risk   2 to < 3%: Medium Risk   0 to < 2%: Low Risk     Last Change: N/A          This score determines the patient's risk of having a stroke if the patient has atrial fibrillation.        This score is not applicable to this patient. Components are not calculated.          Revised Cardiac Risk Index      Flowsheet Row Pre-Admission Testing from 10/22/2024 in [facility]   High-Risk Surgery (Intraperitoneal, Intrathoracic,Suprainguinal vascular) 0 filed at 10/22/2024 1503   History of ischemic heart disease (History of MI, History of positive exercuse test, Current chest paint considered due to  myocardial ischemia, Use of nitrate therapy, ECG with pathological Q Waves) 0 filed at 10/22/2024 1503   History of congestive heart failure (pulmonary edemia, bilateral rales or S3 gallop, Paroxysmal nocturnal dyspnea, CXR showing pulmonary vascular redistribution) 0 filed at 10/22/2024 1503   History of cerebrovascular disease (Prior TIA or stroke) 0 filed at 10/22/2024 1503   Pre-operative insulin treatment 0 filed at 10/22/2024 1503   Pre-operative creatinine>2 mg/dl 0 filed at 10/22/2024 1503   Revised Cardiac Risk Calculator 0 filed at 10/22/2024 1503          Apfel Simplified Score    No data to display       Risk Analysis Index Results This Encounter    No data found in the last 10 encounters.       Stop Bang Score      Flowsheet Row Pre-Admission Testing from 10/22/2024 in [facility]   Do you snore loudly? 0 filed at 10/22/2024 1411   Do you often feel tired or fatigued after your sleep? 1 filed at 10/22/2024 1411   Has anyone ever observed you stop breathing in your sleep? 0 filed at 10/22/2024 1411   Do you have or are you being treated for high blood pressure? 1 filed at 10/22/2024 1411   Recent BMI (Calculated) 23 filed at 10/22/2024 1411   Is BMI greater than 35 kg/m2? 0=No filed at 10/22/2024 1411   Age older than 50 years old? 1=Yes filed at 10/22/2024 1411   Is your neck circumference greater than 17 inches (Male) or 16 inches (Female)? 0 filed at 10/22/2024 1411   Gender - Male 1=Yes filed at 10/22/2024 1411   STOP-BANG Total Score 4 filed at 10/22/2024 1411          Assessment & Plan:    Neuro:  No diagnosis or significant findings on chart review or clinical presentation and evaluation.     HEENT/Airway:  No diagnosis or significant findings on chart review or clinical presentation and evaluation.   STOP-BANG Score-4 points moderate risk for SULAIMAN    Mallampati::  II    TM distance::  >3 FB    Neck ROM::  Full  Dentures-denies  Crowns-denies  Implants-denies  Missing teeth, teeth  in poor repair    Cardiovascular:  No significant findings on chart review or clinical presentation and evaluation.   History of Coronary artery disease-Managed with Aspirin. CCS completed 7/11/2023 516.   History of Hypertension-Managed with Lisinopril. BP in /74  METS: 7  RCRI: 0 points, 3.9%  risk for postoperative MACE   YURIDIA: 0.2% risk for postoperative MACE  EKG -normal sinus rhythm, RBBB Rate-75 No acute changes    Pulmonary:  No significant findings on chart review or clinical presentation and evaluation.   History of COPD-Current smoker. LSCTAB and resting SPO2 100% RA. No current medications  ARISCAT: <26 points, 1.6% risk of in-hospital postoperative pulmonary complication  PRODIGY: Moderate risk for opioid induced respiratory depression  Smoking History-Current smoker 1PPD x 50 years  Discussed smoking cessation and deep breathing handout given    Renal/Urinary:  No diagnosis or significant findings on chart review or clinical presentation and evaluation, however, the patient is at increased risk of perioperative renal complications secondary to age>/= 56, male sex, and HTN. Preventative measures include BP monitoring, medication compliance, and hydration management.   BMP-Reviewed, stable  Creatinine-0.74  GFR->90    Endocrine:  No diagnosis or significant findings on chart review or clinical presentation and evaluation.     Hematologic/Immunology:  No significant findings on chart review or clinical presentation and evaluation.  History of Pulmonary embolism-Post op 15 years ago. No reoccurrence. Currently on Aspirin  The patient is not a Jehovah’s witness and will accept blood and blood products if medically indicated.   History of previous blood transfusions No  CBC-Reviewed, stable  HGB-14.8  Caprini Score 8, patient at High for postoperative DVT. Pt supplied education/VTE handout  Anticoagulation use: Yes Aspirin  Aspirin instructions-OK to DC Aspirin 7 days preoperatively per cardiology   Matilde. Patient aware and has no questions at this time.     Gastrointestinal:   No diagnosis or significant findings on chart review or clinical presentation and evaluation.   Recreational drug use: Drug use No  Alcohol use none    Infectious disease:   No diagnosis or significant findings on chart review or clinical presentation and evaluation.     Musculoskeletal:   No diagnosis or significant findings on chart review or clinical presentation and evaluation.   JHFRAT score-8 points. moderate risk for falls    Anesthesia:  ASA 2 - Patient with mild systemic disease with no functional limitations  Anticipated anesthesia-General  History of General anesthesia- yes  Complications- No anesthesia complications  No family history of anesthesia complications    Abnormalities noted on PAT evaluation: Yes - Reports intermittent chest pain/SOB. Random. Sharp/non-radiating. No pain in PAT. Will refer to cardiology for preoperative cardiac clearance    Labs & Imaging ordered:  BMP, UA, EKG  Nickel/metal allergy-negative  Shellfish allergy-negative    Overall, patient Moderate Risk for the scheduled Low Risk surgery. Discussed with patient medication instructions, NPO guidelines, and any questions or concerns.     Face to face time-30 minutes     Cardiac clearance for elective prostate biopsy provided by cardiology Dr Clayton. See office notes scanned into media from 10/28/2024

## 2024-10-22 NOTE — PREPROCEDURE INSTRUCTIONS
Medication List            Accurate as of October 22, 2024  2:27 PM. Always use your most recent med list.                acetaminophen-codeine 300-30 mg tablet  Commonly known as: Tylenol w/ Codeine #3  Medication Adjustments for Surgery: Take/Use as prescribed     aspirin 81 mg EC tablet  Additional Medication Adjustments for Surgery: Take last dose 7 days before surgery  Notes to patient: Last dose preoperatively 11/4/24     carvedilol 3.125 mg tablet  Commonly known as: Coreg     fluticasone 50 mcg/actuation nasal spray  Commonly known as: Flonase  Medication Adjustments for Surgery: Take/Use as prescribed     HYDROcodone-acetaminophen 5-325 mg tablet  Commonly known as: Norco  Medication Adjustments for Surgery: Take/Use as prescribed     indapamide 1.25 mg tablet  Commonly known as: Lozol     lidocaine 2 % jelly  Commonly known as: Xylocaine  Medication Adjustments for Surgery: Do Not take on the morning of surgery     lidocaine 5 % ointment  Commonly known as: Xylocaine  Medication Adjustments for Surgery: Do Not take on the morning of surgery     lisinopril 20 mg tablet     naproxen 250 mg tablet  Commonly known as: Naprosyn  Additional Medication Adjustments for Surgery: Take last dose 7 days before surgery  Notes to patient: Last dose preoperatively 11/4/24     pseudoephedrine-DM-guaifenesin 60- mg tablet     rOPINIRole 0.25 mg tablet  Commonly known as: Requip     timolol 0.5 % ophthalmic solution  Commonly known as: Timoptic     tiZANidine 4 mg tablet  Commonly known as: Zanaflex     traZODone 50 mg tablet  Commonly known as: Desyrel            NPO Instructions:     Do not eat any food after midnight the night before your surgery/procedure.  You may have clear liquids until TWO hours before surgery/procedure. This includes water, black tea/coffee, (no milk or cream) apple juice and electrolyte drinks (Gatorade).  You may chew gum up to TWO hours before your surgery/procedure.     Additional  Instructions:      Seven/Six Days before Surgery:  Review your medication instructions, stop indicated medications  Five Days before Surgery:  Review your medication instructions, stop indicated medications  Three Days before Surgery:  Review your medication instructions, stop indicated medications  The Day before Surgery:  No smoking or alcohol use 24 hours before surgery  Review your medication instructions, stop indicated medications  You will be contacted regarding the time of your arrival to facility and surgery time  Do not eat any food after Midnight  Day of Surgery:  Review your medication instructions, take indicated medications  If you have diabetes, please check your fasting blood sugar upon awakening.  If fasting blood sugar is <80 mg/dl, drink 100 ml of apple juice, time limit of 2 hours before  You may have clear liquids until TWO hours before surgery/procedure.  This includes water, black tea/coffee, (no milk or cream) apple juice and electrolyte drinks (Gatorade)  You may chew gum up to TWO hours before your surgery/procedure  Wear  comfortable loose fitting clothing  Do not use moisturizers, creams, lotions or perfume  All jewelry and valuables should be left at home     CONTACT SURGEON'S OFFICE IF YOU DEVELOP:  * Fever = 100.4 F   * New respiratory symptoms (e.g. cough, shortness of breath, respiratory distress, sore throat)  * Recent loss of taste or smell  *Flu like symptoms such as headache, fatigue or gastrointestinal symptoms  * You develop any open sores, shingles, burning or painful urination   AND/OR:  * You no longer wish to have the surgery.  * Any other personal circumstances change that may lead to the need to cancel or defer this surgery.  *You were admitted to any hospital within one week of your planned procedure.     SMOKING:  *Quitting smoking can make a huge difference to your health and recovery from surgery.    *If you need help with quitting, call 1-134-QUIT-NOW.     THE DAY  BEFORE SURGERY:  *Do not eat any food after midnight the night before your surgery.   *You may have up to TEN OUNCES of clear liquids until TWO hours before your instructed ARRIVAL TIME to hospital. This includes water, black tea/coffee, (no milk or cream) apple juice, clear broth and electrolyte drinks (Gatorade). Please avoid clear liquids that are red in color.   *You may chew gum/mints up to TWO hours before your surgery/procedure.     SURGICAL TIME:  *You will be contacted between 2 p.m. and 3 p.m. the business day before your surgery with your arrival time.  *If you haven't received a call by 3pm, call [phone]  *Scheduled surgery times may change and you will be notified if this occurs-check your personal voicemail for any updates.     ON THE MORNING OF SURGERY:  *Wear comfortable, loose fitting clothing.   *Do not use moisturizers, creams, lotions or perfume.  *All jewelry and valuables should be left at home.  *Prosthetic devices such as contact lenses, hearing aids, dentures, eyelash extensions, hairpins and body piercing must be removed before surgery.     BRING WITH YOU:  *Photo ID and insurance card  *Current list of medications and allergies  *Pacemaker/Defibrillator/Heart stent cards  *CPAP machine and mask  *Slings/splints/crutches  *Copy of your complete Advanced Directive/DHPOA-if applicable  *Neurostimulator implant remote     PARKING AND ARRIVAL:  *Check in at the Main Entrance desk and let them know you are here for surgery.     IF YOU ARE HAVING OUTPATIENT/SAME DAY SURGERY:  *A responsible adult MUST accompany you at the time of discharge and stay with you for 24 hours after your surgery.  *You may NOT drive yourself home after surgery.  *You may use a taxi or ride sharing service ([employer], Uber) to return home ONLY if you are accompanied by a friend or family member.  *Instructions for resuming your medications will be provided by your surgeon.     Thank you for coming to Pre Admission  testing.      If I have prescribe medication please don't forget to  at your pharmacy.      Any questions about today's visit call [phone] and leave a message in the general mailbox.     Patient instructed to ambulate as soon as possible postoperatively to decrease thromboembolic risk.     Paul Villa, APRN-CNP     Thank you for visiting the [facility].  If you have any changes to your health condition, please call the surgeons office to alert them and give them details of your symptoms.        Preoperative Fasting Guidelines     Why must I stop eating and drinking near surgery time?  With sedation, food or liquid in your stomach can enter your lungs causing serious complications  Increases nausea and vomiting     When do I need to stop eating and drinking before my surgery?  Do not eat any food after midnight the night before your surgery/procedure.  You may have up to TEN ounces of clear liquid until TWO hours before your instructed arrival time to the hospital.  This includes water, black tea/coffee, (no milk or cream) apple juice, and electrolyte drinks (Gatorade)  You may chew gum until TWO hours before your surgery/procedure        Additional Instructions:      The Day before Surgery:  -Review your medication instructions, stop indicated medications  -You will be contacted in the evening regarding the time of your arrival to facility and surgery time     Day of Surgery:  -Review your medication instructions, take indicated medications  -Wear comfortable loose fitting clothing  -Do not use moisturizers, creams, lotions or perfume  -All jewelry and valuables should be left at home                   Preoperative Brain Exercises     What are brain exercises?  A brain exercise is any activity that engages your thinking (cognitive) skills.     What types of activities are considered brain exercises?  Jigsaw puzzles, crossword puzzles, word jumble, memory games, word search, and  many more.  Many can be found free online or on your phone via a mobile wayne.     Why should I do brain exercises before my surgery?  More recent research has shown brain exercise before surgery can lower the risk of postoperative delirium (confusion) which can be especially important for older adults.  Patients who did brain exercises for 5 to 10 hours the days before surgery, cut their risk of postoperative delirium in half up to 1 week after surgery.                         The  [facility]     Preoperative Deep Breathing Exercises     Why it is important to do deep breathing exercises before my surgery?  Deep breathing exercises strengthen your breathing muscles.  This helps you to recover after your surgery and decreases the chance of breathing complications.        How are the deep breathing exercises done?  Sit straight with your back supported.  Breathe in deeply and slowly through your nose. Your lower rib cage should expand and your abdomen may move forward.  Hold that breath for 3 to 5 seconds.  Breathe out through pursed lips, slowly and completely.  Rest and repeat 10 times every hour while awake.  Rest longer if you become dizzy or lightheaded.                      [facility]     Preoperative Deep Breathing Exercises     Why it is important to do deep breathing exercises before my surgery?  Deep breathing exercises strengthen your breathing muscles.  This helps you to recover after your surgery and decreases the chance of breathing complications.        How are the deep breathing exercises done?  Sit straight with your back supported.  Breathe in deeply and slowly through your nose. Your lower rib cage should expand and your abdomen may move forward.  Hold that breath for 3 to 5 seconds.  Breathe out through pursed lips, slowly and completely.  Rest and repeat 10 times every hour while awake.  Rest longer if you become dizzy or lightheaded.        Patient  Information: Incentive Spirometer  What is an incentive spirometer?  An incentive spirometer is a device used before and after surgery to “exercise” your lungs.  It helps you to take deeper breaths to expand your lungs.  Below is an example of a basic incentive spirometer.  The device you receive may differ slightly but they all function the same.    Why do I need to use an incentive spirometer?  Using your incentive spirometer prepares your lungs for surgery and helps prevent lung problems after surgery.  How do I use my incentive spirometer?  When you're using your incentive spirometer, make sure to breathe through your mouth. If you breathe through your nose, the incentive spirometer won't work properly. You can hold your nose if you have trouble.  If you feel dizzy at any time, stop and rest. Try again at a later time.  Follow the steps below:  Set up your incentive spirometer, expand the flexible tubing and connect to the outlet.  Sit upright in a chair or bed. Hold the incentive spirometer at eye level.   Put the mouthpiece in your mouth and close your lips tightly around it. Slowly breathe out (exhale) completely.  Breathe in (inhale) slowly through your mouth as deeply as you can. As you take a breath, you will see the piston rise inside the large column. While the piston rises, the indicator should move upwards. It should stay in between the 2 arrows (see Figure).  Try to get the piston as high as you can, while keeping the indicator between the arrows.   If the indicator doesn't stay between the arrows, you're breathing either too fast or too slow.  When you get it as high as you can, hold your breath for 10 seconds, or as long as possible. While you're holding your breath, the piston will slowly fall to the base of the spirometer.  Once the piston reaches the bottom of the spirometer, breathe out slowly through your mouth. Rest for a few seconds.  Repeat 10 times. Try to get the piston to the same level  with each breath.  Repeat every hour while awake  You can carefully clean the outside of the mouthpiece with an alcohol wipe or soap and water.       Patient and Family Education             Ways You Can Help Prevent Blood Clots                    This handout explains some simple things you can do to help prevent blood clots.      Blood clots are blockages that can form in the body's veins. When a blood clot forms in your deep veins, it may be called a deep vein thrombosis, or DVT for short. Blood clots can happen in any part of the body where blood flows, but they are most common in the arms and legs. If a piece of a blood clot breaks free and travels to the lungs, it is called a pulmonary embolus (PE). A PE can be a very serious problem.         Being in the hospital or having surgery can raise your chances of getting a blood clot because you may not be well enough to move around as much as you normally do.         Ways you can help prevent blood clots in the hospital           Wearing SCDs. SCDs stands for Sequential Compression Devices.   SCDs are special sleeves that wrap around your legs  They attach to a pump that fills them with air to gently squeeze your legs every few minutes.   This helps return the blood in your legs to your heart.   SCDs should only be taken off when walking or bathing.   SCDs may not be comfortable, but they can help save your life.                                            Wearing compression stockings - if your doctor orders them. These special snug fitting stockings gently squeeze your legs to help blood flow.       Walking. Walking helps move the blood in your legs.   If your doctor says it is ok, try walking the halls at least   5 times a day. Ask us to help you get up, so you don't fall.      Taking any blood thinning medicines your doctor orders.        Page 1 of 2            [facility]; 3/23   Ways you can help prevent blood clots at home         Wearing  compression stockings - if your doctor orders them. ? Walking - to help move the blood in your legs.       Taking any blood thinning medicines your doctor orders.      Signs of a blood clot or PE        Tell your doctor or nurse know right away if you have of the problems listed below.    If you are at home, seek medical care right away. Call 911 for chest pain or problems breathing.                Signs of a blood clot (DVT) - such as pain,  swelling, redness or warmth in your arm or leg      Signs of a pulmonary embolism (PE) - such as chest pain or feeling short of breath

## 2024-10-23 LAB
ATRIAL RATE: 75 BPM
P OFFSET: 173 MS
P ONSET: 125 MS
PR INTERVAL: 158 MS
Q ONSET: 204 MS
QRS COUNT: 12 BEATS
QRS DURATION: 126 MS
QT INTERVAL: 390 MS
QTC CALCULATION(BAZETT): 435 MS
QTC FREDERICIA: 419 MS
R AXIS: 183 DEGREES
T AXIS: 102 DEGREES
T OFFSET: 399 MS
VENTRICULAR RATE: 75 BPM

## 2024-10-25 ENCOUNTER — APPOINTMENT (OUTPATIENT)
Dept: PREADMISSION TESTING | Facility: HOSPITAL | Age: 69
End: 2024-10-25
Payer: COMMERCIAL

## 2024-11-12 ENCOUNTER — HOSPITAL ENCOUNTER (OUTPATIENT)
Facility: HOSPITAL | Age: 69
Setting detail: OUTPATIENT SURGERY
Discharge: HOME | End: 2024-11-12
Attending: STUDENT IN AN ORGANIZED HEALTH CARE EDUCATION/TRAINING PROGRAM | Admitting: STUDENT IN AN ORGANIZED HEALTH CARE EDUCATION/TRAINING PROGRAM
Payer: COMMERCIAL

## 2024-11-12 ENCOUNTER — ANESTHESIA (OUTPATIENT)
Dept: OPERATING ROOM | Facility: HOSPITAL | Age: 69
End: 2024-11-12
Payer: COMMERCIAL

## 2024-11-12 ENCOUNTER — ANESTHESIA EVENT (OUTPATIENT)
Dept: OPERATING ROOM | Facility: HOSPITAL | Age: 69
End: 2024-11-12
Payer: COMMERCIAL

## 2024-11-12 VITALS
HEIGHT: 70 IN | WEIGHT: 166.01 LBS | RESPIRATION RATE: 12 BRPM | HEART RATE: 67 BPM | SYSTOLIC BLOOD PRESSURE: 112 MMHG | TEMPERATURE: 97 F | OXYGEN SATURATION: 100 % | BODY MASS INDEX: 23.77 KG/M2 | DIASTOLIC BLOOD PRESSURE: 75 MMHG

## 2024-11-12 DIAGNOSIS — C61 PROSTATE CANCER (MULTI): ICD-10-CM

## 2024-11-12 DIAGNOSIS — R33.8 BENIGN LOCALIZED HYPERPLASIA OF PROSTATE WITH URINARY RETENTION: Primary | ICD-10-CM

## 2024-11-12 DIAGNOSIS — N40.1 BENIGN LOCALIZED HYPERPLASIA OF PROSTATE WITH URINARY RETENTION: Primary | ICD-10-CM

## 2024-11-12 PROCEDURE — 2500000004 HC RX 250 GENERAL PHARMACY W/ HCPCS (ALT 636 FOR OP/ED)

## 2024-11-12 PROCEDURE — 7100000010 HC PHASE TWO TIME - EACH INCREMENTAL 1 MINUTE: Performed by: STUDENT IN AN ORGANIZED HEALTH CARE EDUCATION/TRAINING PROGRAM

## 2024-11-12 PROCEDURE — 3600000004 HC OR TIME - INITIAL BASE CHARGE - PROCEDURE LEVEL FOUR: Performed by: STUDENT IN AN ORGANIZED HEALTH CARE EDUCATION/TRAINING PROGRAM

## 2024-11-12 PROCEDURE — 7100000001 HC RECOVERY ROOM TIME - INITIAL BASE CHARGE: Performed by: STUDENT IN AN ORGANIZED HEALTH CARE EDUCATION/TRAINING PROGRAM

## 2024-11-12 PROCEDURE — 3700000001 HC GENERAL ANESTHESIA TIME - INITIAL BASE CHARGE: Performed by: STUDENT IN AN ORGANIZED HEALTH CARE EDUCATION/TRAINING PROGRAM

## 2024-11-12 PROCEDURE — 7100000002 HC RECOVERY ROOM TIME - EACH INCREMENTAL 1 MINUTE: Performed by: STUDENT IN AN ORGANIZED HEALTH CARE EDUCATION/TRAINING PROGRAM

## 2024-11-12 PROCEDURE — A55700 PR BIOPSY OF PROSTATE,NEEDLE/PUNCH: Performed by: STUDENT IN AN ORGANIZED HEALTH CARE EDUCATION/TRAINING PROGRAM

## 2024-11-12 PROCEDURE — 76872 US TRANSRECTAL: CPT | Performed by: STUDENT IN AN ORGANIZED HEALTH CARE EDUCATION/TRAINING PROGRAM

## 2024-11-12 PROCEDURE — 3700000002 HC GENERAL ANESTHESIA TIME - EACH INCREMENTAL 1 MINUTE: Performed by: STUDENT IN AN ORGANIZED HEALTH CARE EDUCATION/TRAINING PROGRAM

## 2024-11-12 PROCEDURE — 3600000009 HC OR TIME - EACH INCREMENTAL 1 MINUTE - PROCEDURE LEVEL FOUR: Performed by: STUDENT IN AN ORGANIZED HEALTH CARE EDUCATION/TRAINING PROGRAM

## 2024-11-12 PROCEDURE — A55700 PR BIOPSY OF PROSTATE,NEEDLE/PUNCH

## 2024-11-12 PROCEDURE — 55700 PR PROSTATE NEEDLE BIOPSY ANY APPROACH: CPT | Performed by: STUDENT IN AN ORGANIZED HEALTH CARE EDUCATION/TRAINING PROGRAM

## 2024-11-12 PROCEDURE — 2500000004 HC RX 250 GENERAL PHARMACY W/ HCPCS (ALT 636 FOR OP/ED): Performed by: STUDENT IN AN ORGANIZED HEALTH CARE EDUCATION/TRAINING PROGRAM

## 2024-11-12 PROCEDURE — 7100000009 HC PHASE TWO TIME - INITIAL BASE CHARGE: Performed by: STUDENT IN AN ORGANIZED HEALTH CARE EDUCATION/TRAINING PROGRAM

## 2024-11-12 RX ORDER — LIDOCAINE HYDROCHLORIDE 10 MG/ML
INJECTION, SOLUTION INFILTRATION; PERINEURAL AS NEEDED
Status: DISCONTINUED | OUTPATIENT
Start: 2024-11-12 | End: 2024-11-12

## 2024-11-12 RX ORDER — BUPIVACAINE HYDROCHLORIDE 5 MG/ML
INJECTION, SOLUTION PERINEURAL AS NEEDED
Status: DISCONTINUED | OUTPATIENT
Start: 2024-11-12 | End: 2024-11-12 | Stop reason: HOSPADM

## 2024-11-12 RX ORDER — FENTANYL CITRATE 50 UG/ML
50 INJECTION, SOLUTION INTRAMUSCULAR; INTRAVENOUS EVERY 5 MIN PRN
Status: DISCONTINUED | OUTPATIENT
Start: 2024-11-12 | End: 2024-11-12 | Stop reason: HOSPADM

## 2024-11-12 RX ORDER — FENTANYL CITRATE 50 UG/ML
25 INJECTION, SOLUTION INTRAMUSCULAR; INTRAVENOUS EVERY 5 MIN PRN
Status: DISCONTINUED | OUTPATIENT
Start: 2024-11-12 | End: 2024-11-12 | Stop reason: HOSPADM

## 2024-11-12 RX ORDER — IPRATROPIUM BROMIDE 0.5 MG/2.5ML
500 SOLUTION RESPIRATORY (INHALATION) AS NEEDED
Status: DISCONTINUED | OUTPATIENT
Start: 2024-11-12 | End: 2024-11-12 | Stop reason: HOSPADM

## 2024-11-12 RX ORDER — LABETALOL HYDROCHLORIDE 5 MG/ML
5 INJECTION, SOLUTION INTRAVENOUS ONCE AS NEEDED
Status: DISCONTINUED | OUTPATIENT
Start: 2024-11-12 | End: 2024-11-12 | Stop reason: HOSPADM

## 2024-11-12 RX ORDER — HYDRALAZINE HYDROCHLORIDE 20 MG/ML
5 INJECTION INTRAMUSCULAR; INTRAVENOUS EVERY 30 MIN PRN
Status: DISCONTINUED | OUTPATIENT
Start: 2024-11-12 | End: 2024-11-12 | Stop reason: HOSPADM

## 2024-11-12 RX ORDER — CEFAZOLIN SODIUM 2 G/100ML
2 INJECTION, SOLUTION INTRAVENOUS ONCE
Status: DISCONTINUED | OUTPATIENT
Start: 2024-11-12 | End: 2024-11-12 | Stop reason: HOSPADM

## 2024-11-12 RX ORDER — ONDANSETRON HYDROCHLORIDE 2 MG/ML
4 INJECTION, SOLUTION INTRAVENOUS ONCE AS NEEDED
Status: DISCONTINUED | OUTPATIENT
Start: 2024-11-12 | End: 2024-11-12 | Stop reason: HOSPADM

## 2024-11-12 RX ORDER — DIPHENHYDRAMINE HYDROCHLORIDE 50 MG/ML
12.5 INJECTION INTRAMUSCULAR; INTRAVENOUS ONCE AS NEEDED
Status: DISCONTINUED | OUTPATIENT
Start: 2024-11-12 | End: 2024-11-12 | Stop reason: HOSPADM

## 2024-11-12 RX ORDER — PROPOFOL 10 MG/ML
INJECTION, EMULSION INTRAVENOUS AS NEEDED
Status: DISCONTINUED | OUTPATIENT
Start: 2024-11-12 | End: 2024-11-12

## 2024-11-12 RX ORDER — KETOROLAC TROMETHAMINE 30 MG/ML
INJECTION, SOLUTION INTRAMUSCULAR; INTRAVENOUS AS NEEDED
Status: DISCONTINUED | OUTPATIENT
Start: 2024-11-12 | End: 2024-11-12

## 2024-11-12 RX ORDER — PROCHLORPERAZINE EDISYLATE 5 MG/ML
5 INJECTION INTRAMUSCULAR; INTRAVENOUS ONCE AS NEEDED
Status: DISCONTINUED | OUTPATIENT
Start: 2024-11-12 | End: 2024-11-12 | Stop reason: HOSPADM

## 2024-11-12 RX ORDER — ALBUTEROL SULFATE 0.83 MG/ML
2.5 SOLUTION RESPIRATORY (INHALATION) ONCE AS NEEDED
Status: DISCONTINUED | OUTPATIENT
Start: 2024-11-12 | End: 2024-11-12 | Stop reason: HOSPADM

## 2024-11-12 RX ORDER — SODIUM CHLORIDE, SODIUM LACTATE, POTASSIUM CHLORIDE, CALCIUM CHLORIDE 600; 310; 30; 20 MG/100ML; MG/100ML; MG/100ML; MG/100ML
100 INJECTION, SOLUTION INTRAVENOUS CONTINUOUS
Status: DISCONTINUED | OUTPATIENT
Start: 2024-11-12 | End: 2024-11-12 | Stop reason: HOSPADM

## 2024-11-12 RX ORDER — CEFTRIAXONE 2 G/50ML
INJECTION, SOLUTION INTRAVENOUS AS NEEDED
Status: DISCONTINUED | OUTPATIENT
Start: 2024-11-12 | End: 2024-11-12

## 2024-11-12 ASSESSMENT — PAIN - FUNCTIONAL ASSESSMENT
PAIN_FUNCTIONAL_ASSESSMENT: 0-10
PAIN_FUNCTIONAL_ASSESSMENT: WONG-BAKER FACES
PAIN_FUNCTIONAL_ASSESSMENT: 0-10

## 2024-11-12 ASSESSMENT — PAIN SCALES - GENERAL
PAINLEVEL_OUTOF10: 0 - NO PAIN

## 2024-11-12 ASSESSMENT — COLUMBIA-SUICIDE SEVERITY RATING SCALE - C-SSRS
1. IN THE PAST MONTH, HAVE YOU WISHED YOU WERE DEAD OR WISHED YOU COULD GO TO SLEEP AND NOT WAKE UP?: NO
2. HAVE YOU ACTUALLY HAD ANY THOUGHTS OF KILLING YOURSELF?: NO

## 2024-11-12 NOTE — OP NOTE
Fusion prostate biopsy ( Transrectal US, UroNav, MRI @  [place] ) Operative Note     Date: 2024  OR Location: [facility]    Name: Paul Malone, : 1955, Age: 69 y.o., MRN: [id number], Sex: male    Diagnosis  Pre-op Diagnosis      * Prostate cancer (Multi) [C61] Post-op Diagnosis     * Prostate cancer (Multi) [C61]     Procedures  Fusion prostate biopsy ( Transrectal US, UroNav, MRI @  [place] )  40569 - G US GUIDANCE NEEDLE PLACEMENT [facility] [place], [place] [facility]      Surgeons      * Janak Jolly - Primary    Resident/Fellow/Other Assistant:  Surgeons and Role:  * No surgeons found with a matching role *    Staff:   Circulator: Mariam Barrett Person: Abdulaziz    Anesthesia Staff: Anesthesiologist: Conrado Merino MD  C-AA: SENTHIL Cunningham        Estimated Blood Loss (ml)  5.   Specimen(s) Removed  Removed region of interest and systematic biopsies.   Drain(s)  None.   Implant(s)  None.   Complications  None.   Indications (History)  Elevated PSA.   Findings of Procedure  20g prostate, karl hypoechoic sp holep.   Description of Procedure  After administration of anesthesia, a prostate block was performed and transrectal ultrasound. Transperineal biopsies taken from region of interest and systematic template performed using the precision point device.

## 2024-11-12 NOTE — DISCHARGE INSTRUCTIONS
Dr. Jolly - [facility]  Phone: [phone]    Follow-Up Information    Following your Prostate Biopsy, please follow up with Dr. Jolly as scheduled    Medication  Please take the medications as prescribed by your doctor. Tylenol or non-steroids! anti-inflammatory medications (such as Aleve®) should relieve mild pain and discomfort. Resume the usual medications you took before surgery unless instructed otherwise.    Resuming Activities and Driving  *NO Driving on the day of surgery  *You may resume driving the day after surgery  *You may resume normal activities as tolerated  *You may shower     Diet  You may resume your normal diet once at home, with no additional considerations.    Expected Signs and Symptoms  You may have a small amount of bleeding with urination on occasion. This may be accompanied with small blood clots. This is normal and should be relieved by increasing your fluid intake.  You may experience some mild burning and discomfort during urination. This is normal and should subside in one to two weeks.      When to Call Your Doctor - Dr. Jolly [phone]  Please call the office immediately if any of the following symptoms appear:    *Inability to eat, drink, or take medication  *Persistent Nausea or Vomiting  *Fever over 100.4 degrees F. (38 degrees C.)    Please call 9-1-1 if you experience any of the following:  *Chest Pain, Shortness of Breath or Difficulty Breathing  *Sudden one sided weakness/slurred speech/ any signs or symptoms of a stroke  *Severe headache or visual disturbance    Additional Home-Going Instructions for Adults Who Have Had Anesthesia or Sedatives:    The anesthetics, sedatives and pain killers which were given to you will be acting in your body for the next 24 hours.  This may cause you to feel sleepy.  This feeling will slowly wear off.    For the next 24 hours you SHOULD NOT:  *Drive a car, or operate machinery or power tools  *Drink any form of alcohol  (including beer or wine)  *Make any important Decisions

## 2024-11-12 NOTE — PERIOPERATIVE NURSING NOTE
Pt received from OR via cart, monitors on, report received. Pt drowsy, nasal trumpet removed by CRNA prior to leaving bedside. Pt repositioned, Report received. Orders reviewed/released.  Assessment complete as documented.

## 2024-11-12 NOTE — OP NOTE
Fusion prostate biopsy ( Transrectal US, UroNav, MRI @  [place] ) Operative Note     Date: 2024  OR Location: [facility]    Name: Paul Malone, : 1955, Age: 69 y.o., MRN: [id number], Sex: male    Diagnosis  Pre-op Diagnosis      * Prostate cancer (Multi) [C61] Post-op Diagnosis     * Prostate cancer (Multi) [C61]     Procedures  Fusion prostate biopsy ( Transrectal US, UroNav, MRI @  [place] )  88198 - G US GUIDANCE NEEDLE PLACEMENT [facility]      Surgeons      * Janak Jolly - Primary    Resident/Fellow/Other Assistant:  Surgeons and Role:  * No surgeons found with a matching role *    Staff:   Circulator: Mariam  Scrub Person: Abdulaziz        Estimated Blood Loss (ml)  5.   Specimen(s) Removed  Removed region of interest and systematic biopsies.   Drain(s)  None.   Implant(s)  None.   Complications  None.   Indications (History)  Elevated PSA.   Findings of Procedure  20g prostate, karl hypechoic sp HOLEP.   Description of Procedure  After administration of anesthesia, a prostate block was performed and transrectal ultrasound. Transperineal biopsies taken from region of interest and systematic template performed using the precision point device.

## 2024-11-12 NOTE — ANESTHESIA PREPROCEDURE EVALUATION
Patient: Paul Malone    Procedure Information       Date/Time: 11/12/24 1005    Procedure: Fusion prostate biopsy ( Transrectal US, UroNav, MRI @  [place] )    Location: [facility] 01 / [facility]    Surgeons: Janak Jolly MD            Relevant Problems   Anesthesia (within normal limits)      Cardiac   (-) History of coronary artery bypass graft   (-) Pacemaker      Pulmonary   (-) Asthma   (-) Chronic obstructive pulmonary disease (Multi)   (-) SULAIMAN (obstructive sleep apnea)      Neuro   (-) CVA (cerebral vascular accident) (Multi)   (-) Seizures (Multi)      /Renal   (+) Benign localized hyperplasia of prostate with urinary retention   (+) Prostate cancer (Multi)      Endocrine   (-) Diabetes mellitus, type 2 (Multi)      Hematology   (-) Coagulopathy (Multi)       Clinical information reviewed:   Tobacco  Allergies  Meds   Med Hx  Surg Hx   Fam Hx  Soc Hx        NPO Detail:  No data recorded     Physical Exam    Airway  Mallampati: II  TM distance: >3 FB  Neck ROM: full     Cardiovascular    Dental   Comments: Poor dentition   Pulmonary    Abdominal            Anesthesia Plan    History of general anesthesia?: yes  History of complications of general anesthesia?: no    ASA 2     MAC     intravenous induction   Postoperative administration of opioids is intended.  Anesthetic plan and risks discussed with patient.

## 2024-11-12 NOTE — ANESTHESIA POSTPROCEDURE EVALUATION
Patient: Paul Malone    Procedure Summary       Date: 11/12/24 Room / Location: [facility] / [facility]    Anesthesia Start: 1047 Anesthesia Stop: 1120    Procedure: Fusion prostate biopsy ( Transrectal US, UroNav, MRI @  [place] ) Diagnosis:       Prostate cancer (Multi)      (Prostate cancer (Multi) [C61])    Surgeons: Janak Jolly MD Responsible Provider: Conrado Merino MD    Anesthesia Type: MAC ASA Status: 2            Anesthesia Type: MAC    Vitals Value Taken Time   /76 11/12/24 1145   Temp 36.1 °C (97 °F) 11/12/24 1114   Pulse 66 11/12/24 1145   Resp 12 11/12/24 1145   SpO2 100 % 11/12/24 1145       Anesthesia Post Evaluation    Patient location during evaluation: PACU  Patient participation: complete - patient participated  Level of consciousness: awake  Pain management: adequate  Multimodal analgesia pain management approach  Airway patency: patent  Cardiovascular status: acceptable and hemodynamically stable  Respiratory status: acceptable and spontaneous ventilation  Hydration status: euvolemic  Postoperative Nausea and Vomiting: none  Comments: No nausea or vomiting        There were no known notable events for this encounter.

## 2024-11-12 NOTE — H&P
History Of Present Illness  Paul Malone is a 69 y.o. male presenting with elevated psa.     Past Medical History  Past Medical History:   Diagnosis Date    CAD (coronary artery disease)     COPD (chronic obstructive pulmonary disease) (Multi)     Fatty liver     HTN (hypertension)     Presence of other specified devices 04/12/2022    Chronic indwelling Seo catheter    Pulmonary embolism     RLS (restless legs syndrome)        Surgical History  Past Surgical History:   Procedure Laterality Date    PROSTATE SURGERY      SPINE SURGERY          Social History  He reports that he has been smoking cigarettes. He has never used smokeless tobacco. No history on file for alcohol use and drug use.    Family History  No family history on file.     Allergies  Patient has no known allergies.    Review of Systems     Physical Exam     Last Recorded Vitals  There were no vitals taken for this visit.    Relevant Results             Assessment/Plan   Assessment & Plan  Prostate cancer (Multi)      biopsy       I spent  minutes in the professional and overall care of this patient.      Janak Jolly MD

## 2024-11-21 LAB
LAB AP BLOCK FOR ADDITIONAL STUDIES: NORMAL
LABORATORY COMMENT REPORT: NORMAL
PATH REPORT.FINAL DX SPEC: NORMAL
PATH REPORT.GROSS SPEC: NORMAL
PATH REPORT.RELEVANT HX SPEC: NORMAL
PATH REPORT.TOTAL CANCER: NORMAL

## 2024-11-27 ENCOUNTER — APPOINTMENT (OUTPATIENT)
Dept: UROLOGY | Facility: CLINIC | Age: 69
End: 2024-11-27
Payer: COMMERCIAL

## 2024-11-27 DIAGNOSIS — C61 PROSTATE CANCER (MULTI): Primary | ICD-10-CM

## 2024-11-27 PROCEDURE — 99214 OFFICE O/P EST MOD 30 MIN: CPT | Performed by: STUDENT IN AN ORGANIZED HEALTH CARE EDUCATION/TRAINING PROGRAM

## 2024-11-27 NOTE — PROGRESS NOTES
HPI:  Proc (11/12/24): TP prostate biopsy   Path: prostatic adenocarcinoma, GG3 (Edie score 4+3=7), 2/2 cores (15% of tissue), GG2, GG1    Paul Malone is a 69 y.o. male referred by Dr. Medrano for  low-grade prostate cancer, elevated PSA, BPH s/p HoLEP (5/5/22). Hx of BPH w/ LUTS, elevated PSA, IBS, HTN, HLD. MRI Prostate (9/12/24) showed 18.9g, diffuse T2 hypointensity of the PZ, which limits the assessment for focal lesions, a PI-RADS 4 lesion in the left posterior apical PZ, no gross extracapsular extension, no enlarged pelvic lymph nodes. S/p TP prostate biopsy (11/12/24) with pathology showing prostatic adenocarcinoma, GG3 ([place]bert[place] score 4+3=7), 2/2 cores (15% of tissue), GG2, GG1. Reports hematuria s/p biopsy, now resolved. Reports urinary urgency and frequency. Reports LUTS. Endorses ED. Reports chronic constipation from IBS.     PSA: 1.9 from Aug 2024? Per Dr. Medrano note     MRI Prostate (9/12/24): 18.9g, diffuse T2 hypointensity of the PZ, which limits the assessment for focal lesions, a PI-RADS 4 lesion in the left posterior apical PZ, no gross extracapsular extension, no enlarged pelvic lymph nodes.    Review of Systems:  All systems reviewed. Anything negative noted in the HPI.    Physical Exam:  Virtual visit.     Procedures:    Assessment/Plan   Paul Malone is a 69 y.o. male referred by Dr. Medrano for  low-grade prostate cancer, elevated PSA, BPH s/p HoLEP (5/5/22). Hx of BPH w/ LUTS, elevated PSA, IBS, HLD, HTN. MRI Prostate (9/12/24) showed 18.9g, diffuse T2 hypointensity of the PZ, which limits the assessment for focal lesions, a PI-RADS 4 lesion in the left posterior apical PZ, no gross extracapsular extension, no enlarged pelvic lymph nodes. S/p TP prostate biopsy (11/12/24) with pathology showing prostatic adenocarcinoma, GG3 ([place] score 4+3=7), 2/2 cores (15% of tissue), GG2, GG1. Reports hematuria s/p biopsy, now resolved. Reports urinary urgency and frequency. Reports LUTS.  Endorses ED. Reports chronic constipation from IBS. Management options including risks, benefits and alternatives discussed at length and all questions answered. Patient prefers to proceed with PSMA/PET, referral to radiation oncology for additional treatment opinions, and follow-up in 1 month.    Will send patient surgery packet today.             Scribe Attestation:  By signing my name below, Jeanie DESIR Scribgiovanny   attest that this documentation has been prepared under the direction and in the presence of Janak Jolly MD.

## 2024-12-09 ENCOUNTER — TELEPHONE (OUTPATIENT)
Dept: RADIATION ONCOLOGY | Facility: HOSPITAL | Age: 69
End: 2024-12-09
Payer: COMMERCIAL

## 2024-12-10 ENCOUNTER — HOSPITAL ENCOUNTER (OUTPATIENT)
Dept: RADIATION ONCOLOGY | Facility: HOSPITAL | Age: 69
Setting detail: RADIATION/ONCOLOGY SERIES
Discharge: HOME | End: 2024-12-10
Payer: COMMERCIAL

## 2024-12-10 ENCOUNTER — APPOINTMENT (OUTPATIENT)
Dept: RADIATION ONCOLOGY | Facility: HOSPITAL | Age: 69
End: 2024-12-10
Payer: COMMERCIAL

## 2024-12-10 DIAGNOSIS — Z72.0 SMOKING TRYING TO QUIT: Primary | ICD-10-CM

## 2024-12-10 DIAGNOSIS — C61 PROSTATE CANCER (MULTI): ICD-10-CM

## 2024-12-10 PROCEDURE — 99215 OFFICE O/P EST HI 40 MIN: CPT | Mod: 95 | Performed by: STUDENT IN AN ORGANIZED HEALTH CARE EDUCATION/TRAINING PROGRAM

## 2024-12-10 PROCEDURE — 99205 OFFICE O/P NEW HI 60 MIN: CPT | Performed by: STUDENT IN AN ORGANIZED HEALTH CARE EDUCATION/TRAINING PROGRAM

## 2024-12-10 RX ORDER — IBUPROFEN 200 MG
1 TABLET ORAL EVERY 24 HOURS
Status: CANCELLED | OUTPATIENT
Start: 2024-12-10

## 2024-12-10 RX ORDER — EZETIMIBE 10 MG/1
10 TABLET ORAL DAILY
COMMUNITY

## 2024-12-10 RX ORDER — LISINOPRIL 10 MG/1
10 TABLET ORAL DAILY
COMMUNITY

## 2024-12-10 RX ORDER — IBUPROFEN 200 MG
1 TABLET ORAL EVERY 24 HOURS
Qty: 42 PATCH | Refills: 0 | Status: SHIPPED | OUTPATIENT
Start: 2024-12-10 | End: 2025-01-21

## 2024-12-10 RX ORDER — NICOTINE 7MG/24HR
1 PATCH, TRANSDERMAL 24 HOURS TRANSDERMAL EVERY 24 HOURS
Qty: 14 PATCH | Refills: 0 | Status: SHIPPED | OUTPATIENT
Start: 2024-12-10 | End: 2024-12-24

## 2024-12-10 ASSESSMENT — ENCOUNTER SYMPTOMS
CONSTITUTIONAL NEGATIVE: 1
EXTREMITY WEAKNESS: 1
EYES NEGATIVE: 1
DIFFICULTY URINATING: 1
GASTROINTESTINAL NEGATIVE: 1
BACK PAIN: 1
RESPIRATORY NEGATIVE: 1
NUMBNESS: 1
CARDIOVASCULAR NEGATIVE: 1
SLEEP DISTURBANCE: 1
ARTHRALGIAS: 1
ENDOCRINE NEGATIVE: 1
HEMATOLOGIC/LYMPHATIC NEGATIVE: 1

## 2024-12-10 ASSESSMENT — PAIN SCALES - GENERAL: PAINLEVEL_OUTOF10: 6

## 2024-12-10 NOTE — PROGRESS NOTES
Staff Physician: Maren Elaine MD PhD  Referring Physician: Janak Jolly MD  Date of Service: 12/10/2024  Patient name: Paul Malone   MRN: [id number]    RADIATION ONCOLOGY CONSULT NOTE  Virtual Visit Statement: An interactive audio and video telecommunications system which permits real-time communications between the patient at the originating site and provider at the distant site was utilized to provide this telehealth service.    Verbal consent for encounter: Verbal consent was requested and obtained from the patient on this date for a telehealth visit.    IDENTIFYING DATA:  DIAGNOSIS: Newly diagnosed, localized   Cancer Staging   Prostate cancer (Multi)  Staging form: Prostate, AJCC 8th Edition  - Clinical stage from 11/12/2024: cT1c, cN0, PSA: 1.9, Grade Group: 3 - Signed by Maren Elaine MD PhD on 12/10/2024    DISEASE STATE: No prior treatment  DISEASE STATUS: Treatment pending work-up  Problem List Items Addressed This Visit       Prostate cancer (Multi)    Relevant Orders    Referral to Radiation Oncology    Prostate Specific Antigen, Screen    Testosterone     Other Visit Diagnoses       Smoking trying to quit    -  Primary    Relevant Medications    nicotine (Nicoderm CQ) 7 mg/24 hr patch    nicotine (Nicoderm CQ) 14 mg/24 hr patch          Mr. Paul Malone is a 69-year-old with newly diagnosed prostate adenocarcinoma, referred by Janak Harvey MD, for evaluation and discussion of treatment recommendations.    HISTORY OF PRESENT ILLNESS:    5/5/2022 HoLEP, pathology noted low GG1 prostate adenocarcinoma    Per chart review, PSA of 1.9    9/12/2024 MRI prostate noted 18.9 g gland, with near-total resection of the TZ.  PI-RADS 4 lesion in the left PZ at apex, no SV invasion or EPE.    9/20/2024 CT C/A/P with IV contrast in setting of blunt chest trauma noted rib fracture of right 6th, 7th, and 9th ribs.  No abnormal lymphadenopathy.    11/12/2024 systematic and targeted biopsy noted GG3,  4/12 cores+, 1/1 targeted core+ (GG3 in target, left posterior)    Today, Mr. Paul Malone is on telephone (no video capabilities), accompanied by his girlfriend.    Brother had prostate cancer treated with protons.   Symptomatically, he reports:    1.  Full independence in activities of daily living. Retired, previously worked with heavy machinery. Continues to smoke 10 cigarettes daily, previously smoking 1ppd. Had PE in post-op setting after back surgery, no longer on anti coagulation.  2.  Urinary function-- HoLEP 5/5/2022, IPSS of 10, predominantly weak stream although much improved compared to prior to HoLEP  3.  Bowel function   4.  Normal appetite. No unintentional weight gain or loss.   5.  Pain score: 5/10 from R sciatica   6.  He denies a personal history of MI or stroke. Denies history of diabetes      PAST MEDICAL HISTORY:  Past Medical History:   Diagnosis Date    CAD (coronary artery disease)     Compression fracture of lumbar vertebra (Multi)     compressed disk fractures, lumbar per patient    COPD (chronic obstructive pulmonary disease) (Multi)     Fatty liver     HTN (hypertension)     Hypercholesteremia     IBS (irritable bowel syndrome)     Presence of other specified devices 04/12/2022    Chronic indwelling Seo catheter    Prostate cancer (Multi)     Pulmonary embolism     RLS (restless legs syndrome)      PAST SURGICAL HISTORY:  Past Surgical History:   Procedure Laterality Date    CYSTOSCOPY      PROSTATE SURGERY  05/05/2022    HOLEP    SPINE SURGERY       ALLERGIES:  No Known Allergies  MEDICATIONS:    Current Outpatient Medications:     aspirin 81 mg EC tablet, Take 1 tablet (81 mg) by mouth once daily., Disp: , Rfl:     carvedilol (Coreg) 3.125 mg tablet, Take 1 tablet (3.125 mg) by mouth 2 times a day., Disp: , Rfl:     ezetimibe (Zetia) 10 mg tablet, Take 1 tablet (10 mg) by mouth once daily., Disp: , Rfl:     lisinopril 10 mg tablet, Take 1 tablet (10 mg) by mouth once daily.,  Disp: , Rfl:     rOPINIRole (Requip) 0.25 mg tablet, Take 2 tablets (0.5 mg) by mouth once daily at bedtime.  1 TO 3 HOURS BEFORE BEDTIME if needed, Disp: , Rfl:     timolol (Timoptic) 0.5 % ophthalmic solution, Administer 1 drop into both eyes 2 times a day., Disp: , Rfl:     traZODone (Desyrel) 50 mg tablet, Take 1 tablet (50 mg) by mouth as needed at bedtime for sleep., Disp: , Rfl:     nicotine (Nicoderm CQ) 14 mg/24 hr patch, Place 1 patch over 24 hours on the skin once every 24 hours. Begin with 14 mg/day patch for 6 weeks, followed by the 7 mg/day patch for 2 weeks., Disp: 42 patch, Rfl: 0    nicotine (Nicoderm CQ) 7 mg/24 hr patch, Place 1 patch over 24 hours on the skin once every 24 hours for 14 days. Begin with 14 mg/day patch for 6 weeks, followed by the 7 mg/day patch for 2 weeks., Disp: 14 patch, Rfl: 0   SOCIAL HISTORY:  Social History     Tobacco Use    Smoking status: Every Day     Current packs/day: 1.00     Types: Cigarettes    Smokeless tobacco: Never    Tobacco comments:     Cutting back from a PPD - currently 10 cigs per day   Substance Use Topics    Alcohol use: Not Currently     Comment: quit 3 months ago     FAMILY HISTORY:  Family History   Problem Relation Name Age of Onset    Prostate cancer Brother          Proton RT       REVIEW OF SYSTEMS:  Please refer to RN note.    PHYSICAL EXAMINATION:  telephone    CTCAE (v5) ADVERSE EVENTS:  Toxicity Assessment          12/10/2024    14:00   Toxicity Assessment   Treatment Site Pelvis - male   Diarrhea Grade 0   Constipation Grade 2       history IBS, mainly constipation, uses suppository every day   Hematuria Grade 0   Urinary Incontinence Grade 1       occasional stress leakage   Erectile Dysfunction Grade 2   Urinary Frequency Grade 0   Urinary Retention Grade 3       s/p HoLEP 2022. IPSS 10, bother 1, mainly weak stream and intermittency   Urinary Urgency Grade 0     PERFORMANCE STATUS:  KPS/ECO, Able to carry on normal activity;  "minor signs or symptoms of disease (ECOG equivalent 0)    LABORATORY AND IMAGING DATA:  Imaging: All imaging was personally reviewed and interpreted in clinic. Findings as per HPI and EMR.    Laboratory/Pathology:  All pertinent labs and pathology were personally reviewed and interpreted in clinic. Findings as per HPI and EMR.  No results found for: \"PSAU\", \"PSA\", \"PSASCREEN\"  No results found for: \"TESTOSTERONE\", \"TESTOTOTMS\"      Lab Results   Component Value Date    BUN 16 10/22/2024    CREATININE 0.74 10/22/2024    EGFR >90 10/22/2024     10/22/2024    K 5.0 10/22/2024     10/22/2024    CO2 27 10/22/2024    CALCIUM 9.6 10/22/2024    HGBA1C 5.7 06/05/2020        IMPRESSION:  69 year-old gentleman with newly diagnosed prostate adenocarcinoma, iPSA 1.9, GG3 (4/12 cores+, 1/1 targeted core+ (GG3 in target, left posterior), PSMA PET staging    Provided PSMA PET/CT is negative for ariella or distant metastases, I discussed that he falls into the unfavorable intermediate risk class of localized prostate cancer, for which treatment options include surgery or definitive external beam radiation therapy (EBRT) with a short course of hormone suppression.  I discussed various EBRT treatment regimens, including moderate hypofractionation over 20 treatments, and ultra hypofractionation over 5 treatments. His IPSS is 10.  He is interested in pursing proton treatment.  I discussed the pros and cons of the proton beam.  Clinical outcomes suggest that efficacy and toxicity profile were similar compared to intensity-modulated photon beam.     I discussed the logistics of radiation, including placement of fiducials into the prostate for improved and spacer gel to reduce radiation dose to the rectum.  Logistics of radiation therapy including CT-based simulation and bowel and bladder preparation were discussed, as well as risks and benefits of radiation therapy with a detailed discussion of possible short- and long-term side " effects including fatigue, toxicities to the bladder, urethra, and rectum, sexual dysfunction, infertility and secondary malignancy in the radiation field.     Potential side effects from hormonal suppression therapy were discussed, which include but are not limited to sexual dysfunction, increased risk of osteoporosis and bone fractures, vasomotor 'hot flashes', weight gain and body composition changes, elevated cholesterol and lipoprotein levels, increased risk for diabetes mellitus/hyperglycemia, cardiovascular disease, fatigue, anemia, gynecomastia, cognitive decline, and decrease in penis and/or testicle size.  The severity and/or permanence of such side effects are expected to be related to the duration of suppression. I encouraged him to exercise to help offset/reduce many of these symptoms, including bone density, weight changes, and muscle mass. Educated on daily Calcium and Vitamin D supplementation.     Mr. Malone asked a number of excellent questions that demonstrated good understanding, and would like to proceed with radiation.    PLAN:  -Decipher testing in process, results pending  -nicotine patch prescribed, currently smoking 10 cigarettes a day, wants to quit  -check labs (PSA + testosterone)  -PSMA PET/CT scheduled for 12/19/24  -we will coordinate spaceOAR + fiducial placement with Dr. Jolly, followed by  CT-sim at [facility] [place]   -plan for proton radiation, 20fx radiation, treat at [facility] [place], plan to start ADT (Lupron) concurrent with RT    Thank you for the opportunity to participate in the care of this kind patient.    Maren Elaine MD PhD  , Radiation Oncology

## 2024-12-10 NOTE — PROGRESS NOTES
Radiation Oncology Nursing Note    IPSS (International Prostate Symptom Score):   10 / 35, bother 1   - He is experiencing urinary incontinence (small amount of dribbling throughout the day).       - He is not experiencing dysuria, hematuria, flank pain.     Sexual function:   - Quality of erections during the last 4 weeks: 1 = None at all  - Use of erectile dysfunction medications:  None    Bowel function:    - Denies hematochezia or pain.    - He does have a history of hemorrhoids.    - He does have a history of inflammatory bowel disease (Crohn's, Ulcerative Colitis). - IBS    Prior Radiotherapy:  No  Radiation Treatments       No radiation treatments to show. (Treatments may have been administered in another system.)          Current Systemic Treatment:  No     Presence of Pacemaker or ICD:  No    History of Autoimmune or Connective Tissue Disorders:  No    Pain: The patient's current pain level was assessed.  They report currently having a pain of 5 out of 10.  They feel their pain is under control with out the use of pain medications.    Review of Systems:  Review of Systems   Constitutional: Negative.    HENT:  Negative.     Eyes: Negative.    Respiratory: Negative.     Cardiovascular: Negative.    Gastrointestinal: Negative.    Endocrine: Negative.    Genitourinary:  Positive for difficulty urinating (intermittency of flow, weak stream, occasional straining) and nocturia (x1).    Musculoskeletal:  Positive for arthralgias (hands) and back pain (lower back compression fractures).   Skin: Negative.    Neurological:  Positive for extremity weakness (weakness in hands) and numbness (hands bilateral hands and feet).   Hematological: Negative.    Psychiatric/Behavioral:  Positive for sleep disturbance (restless leg).

## 2024-12-19 ENCOUNTER — HOSPITAL ENCOUNTER (OUTPATIENT)
Dept: RADIOLOGY | Facility: HOSPITAL | Age: 69
Discharge: HOME | End: 2024-12-19
Payer: COMMERCIAL

## 2024-12-19 ENCOUNTER — TELEPHONE (OUTPATIENT)
Dept: RADIATION ONCOLOGY | Facility: HOSPITAL | Age: 69
End: 2024-12-19

## 2024-12-19 ENCOUNTER — LAB (OUTPATIENT)
Dept: LAB | Facility: LAB | Age: 69
End: 2024-12-19
Payer: COMMERCIAL

## 2024-12-19 DIAGNOSIS — C61 PROSTATE CANCER (MULTI): ICD-10-CM

## 2024-12-19 LAB
PSA SERPL-MCNC: 0.97 NG/ML
TESTOST SERPL-MCNC: 547 NG/DL (ref 240–1000)

## 2024-12-19 PROCEDURE — 3430000001 HC RX 343 DIAGNOSTIC RADIOPHARMACEUTICALS: Performed by: STUDENT IN AN ORGANIZED HEALTH CARE EDUCATION/TRAINING PROGRAM

## 2024-12-19 PROCEDURE — 78815 PET IMAGE W/CT SKULL-THIGH: CPT | Mod: PS

## 2024-12-19 PROCEDURE — 36415 COLL VENOUS BLD VENIPUNCTURE: CPT

## 2024-12-19 PROCEDURE — 84403 ASSAY OF TOTAL TESTOSTERONE: CPT

## 2024-12-19 PROCEDURE — 84153 ASSAY OF PSA TOTAL: CPT

## 2024-12-19 PROCEDURE — A9596 HC RX 343 DIAGNOSTIC RADIOPHARMACEUTICALS: HCPCS | Performed by: STUDENT IN AN ORGANIZED HEALTH CARE EDUCATION/TRAINING PROGRAM

## 2024-12-20 ENCOUNTER — HOSPITAL ENCOUNTER (OUTPATIENT)
Dept: RADIATION ONCOLOGY | Facility: HOSPITAL | Age: 69
Setting detail: RADIATION/ONCOLOGY SERIES
Discharge: HOME | End: 2024-12-20
Payer: COMMERCIAL

## 2024-12-20 DIAGNOSIS — C61 PROSTATE CANCER (MULTI): Primary | ICD-10-CM

## 2024-12-20 LAB
AP SUMMARY REPORT: NORMAL
SCAN RESULT: NORMAL

## 2024-12-20 PROCEDURE — 99213 OFFICE O/P EST LOW 20 MIN: CPT | Mod: 95 | Performed by: STUDENT IN AN ORGANIZED HEALTH CARE EDUCATION/TRAINING PROGRAM

## 2024-12-20 PROCEDURE — 99212 OFFICE O/P EST SF 10 MIN: CPT | Mod: 95 | Performed by: STUDENT IN AN ORGANIZED HEALTH CARE EDUCATION/TRAINING PROGRAM

## 2024-12-20 PROCEDURE — 99212 OFFICE O/P EST SF 10 MIN: CPT | Performed by: STUDENT IN AN ORGANIZED HEALTH CARE EDUCATION/TRAINING PROGRAM

## 2024-12-20 RX ORDER — EPINEPHRINE 0.3 MG/.3ML
0.3 INJECTION SUBCUTANEOUS EVERY 5 MIN PRN
OUTPATIENT
Start: 2025-01-27

## 2024-12-20 RX ORDER — FAMOTIDINE 10 MG/ML
20 INJECTION INTRAVENOUS ONCE AS NEEDED
OUTPATIENT
Start: 2025-01-27

## 2024-12-20 RX ORDER — DIPHENHYDRAMINE HYDROCHLORIDE 50 MG/ML
50 INJECTION INTRAMUSCULAR; INTRAVENOUS AS NEEDED
OUTPATIENT
Start: 2025-01-27

## 2024-12-20 RX ORDER — ALBUTEROL SULFATE 0.83 MG/ML
3 SOLUTION RESPIRATORY (INHALATION) AS NEEDED
OUTPATIENT
Start: 2025-01-27

## 2024-12-20 NOTE — PROGRESS NOTES
Staff Physician: Maren Elaine MD PhD  Date of Service: 12/20/2024  Patient name: Paul Malone   MRN: [id number]    RADIATION ONCOLOGY FOLLOW UP NOTE    Virtual Visit Statement: An interactive audio and video telecommunications system which permits real-time communications between the patient at the originating site and provider at the distant site was utilized to provide this telehealth service.    Verbal consent for encounter: Verbal consent was requested and obtained from the patient on this date for a telehealth visit.    IDENTIFYING DATA:  DIAGNOSIS: Newly diagnosed, localized   Cancer Staging   Prostate cancer (Multi)  Staging form: Prostate, AJCC 8th Edition  - Clinical stage from 11/12/2024: Stage IIC (cT1c, cN0, cM0, PSA: 1.9, Grade Group: 3) - Signed by Maren Elaine MD PhD on 12/20/2024  Prognostic indicators: Decipher 0.41 (low)    DISEASE STATE: No prior treatment  DISEASE STATUS: Treatment pending work-up  Problem List Items Addressed This Visit       Prostate cancer (Multi) - Primary       He was last seen in Radiation Oncology on 12/10/2024 and returns today to discuss next steps.    Oncologic History:    5/5/2022 HoLEP, pathology noted low GG1 prostate adenocarcinoma     Per chart review, PSA of 1.9     9/12/2024 MRI prostate noted 18.9 g gland, with near-total resection of the TZ.  PI-RADS 4 lesion in the left PZ at apex, no SV invasion or EPE.     9/20/2024 CT C/A/P with IV contrast in setting of blunt chest trauma noted rib fracture of right 6th, 7th, and 9th ribs.  No abnormal lymphadenopathy.     11/12/2024 systematic and targeted biopsy noted GG3, 4/12 cores+, 1/1 targeted core+ (GG3 in target, left posterior)    Interval History:  12/19/2024 PSMA PET noted uptake within the prostate, corresponding to the PI-RADS 4 lesion, SUV 11.  No evidence of avidity within SV or nodes or distant disease.    Today, he is on phone by himself, his video does not work. Reports that he has cut down smoking  from 10 cigarettes a day to 2 cigarettes a day.  Nicotine patches were too expensive, $120 per month.  He believes he can quit on his own without 8.    A 10 point review of systems was reviewed with pertinent positives and negatives noted in HPI. All other systems have been reviewed and are negative.    PERFORMANCE STATUS:  Karnofsky Performance Score/ECO, Fully active, able to carry on all pre-disease performed without restriction (ECOG equivalent 0)    PHYSICAL EXAMINATION: N/A (phone)    CTCAE (v5) ADVERSE EVENTS:  Toxicity Assessment          12/10/2024    14:00   Toxicity Assessment   Treatment Site Pelvis - male   Diarrhea Grade 0   Constipation Grade 2       history IBS, mainly constipation, uses suppository every day   Hematuria Grade 0   Urinary Incontinence Grade 1       occasional stress leakage   Erectile Dysfunction Grade 2   Urinary Frequency Grade 0   Urinary Retention Grade 3       s/p HoLEP 2022. IPSS 10, bother 1, mainly weak stream and intermittency   Urinary Urgency Grade 0     DIAGNOSTIC REPORTS REVIEWED:  Imaging: All imaging was personally reviewed and interpreted in clinic. Findings as per interval history and EMR.  Laboratory/Pathology:  All pertinent labs and pathology were personally reviewed and interpreted in clinic. Findings as per interval history and EMR.  Lab Results   Component Value Date    PSASCREEN 0.97 2024     Lab Results   Component Value Date    TESTOSTERONE 547 2024     Lab Results   Component Value Date    BUN 16 10/22/2024    CREATININE 0.74 10/22/2024    EGFR >90 10/22/2024     10/22/2024    K 5.0 10/22/2024     10/22/2024    CO2 27 10/22/2024    CALCIUM 9.6 10/22/2024    HGBA1C 5.7 2020        IMPRESSION:  69 year-old gentleman with newly diagnosed prostate adenocarcinoma, iPSA 1.9, GG3 (4/12 cores+, 1/1 targeted core+ (GG3 in target, left posterior), Decipher 0.41, cN0M0 by PSMA PET. History of HoLEP.     He would like to proceed  with SBRT instead of protons. He lives far away, we will reach out to SW to discuss [facility]. We discussed potential side effects of ADT. He is working to quit smoking on his own, now 2 cigarettes / day.     PLAN:  -we will coordinate spaceOAR + fiducial placement with Dr. Jolly, followed by  CT-sim at [facility] [place]   -plan for 5fx radiation, treat at [facility] [place], plan to start ST-ADT (Lupron) concurrent with RT     He knows to call with any questions or concerns in the interim.    Maren Elaine MD PhD  , Radiation Oncology

## 2024-12-31 ENCOUNTER — PREP FOR PROCEDURE (OUTPATIENT)
Dept: ANESTHESIOLOGY | Facility: HOSPITAL | Age: 69
End: 2024-12-31
Payer: COMMERCIAL

## 2024-12-31 ENCOUNTER — TELEPHONE (OUTPATIENT)
Dept: UROLOGY | Facility: HOSPITAL | Age: 69
End: 2024-12-31
Payer: COMMERCIAL

## 2024-12-31 DIAGNOSIS — C61 PROSTATE CANCER (MULTI): Primary | ICD-10-CM

## 2024-12-31 RX ORDER — CEFAZOLIN SODIUM 2 G/50ML
2 SOLUTION INTRAVENOUS ONCE
OUTPATIENT
Start: 2024-12-31 | End: 2024-12-31

## 2024-12-31 NOTE — TELEPHONE ENCOUNTER
Spoke with patient. Procedure date of 1/29/25 confirmed at [facility].     Surgery information to be mailed. Needs HINA Suero RN

## 2025-01-03 ENCOUNTER — PREP FOR PROCEDURE (OUTPATIENT)
Dept: UROLOGY | Facility: HOSPITAL | Age: 70
End: 2025-01-03
Payer: COMMERCIAL

## 2025-01-09 DIAGNOSIS — C61 PROSTATE CANCER (MULTI): ICD-10-CM

## 2025-01-09 DIAGNOSIS — R33.9 URINARY RETENTION: ICD-10-CM

## 2025-01-09 DIAGNOSIS — R79.1 ABNORMAL COAGULATION PROFILE: ICD-10-CM

## 2025-01-20 ENCOUNTER — TELEPHONE (OUTPATIENT)
Dept: RADIATION ONCOLOGY | Facility: CLINIC | Age: 70
End: 2025-01-20
Payer: COMMERCIAL

## 2025-01-20 DIAGNOSIS — C61 PROSTATE CANCER (MULTI): Primary | ICD-10-CM

## 2025-01-20 NOTE — TELEPHONE ENCOUNTER
Mr. Malone called and has decided he does not want to proceed with ADT.  We discussed this decipher score of 0.41. He understands standard of care would be STADT + RT.     His spacer + fiducials is scheduled for 1/29/25 with Dr. Jolly. He will obtain pre-admission testing with his PCP.     We will schedule CT-sim and MR prostate same day, at [facility] [place].   Will reach out to  to help with [facility] arrangements for his 5 fx treatment.    Maren Elaine MD PhD  , Radiation Oncology

## 2025-01-23 ENCOUNTER — SOCIAL WORK (OUTPATIENT)
Dept: CASE MANAGEMENT | Facility: HOSPITAL | Age: 70
End: 2025-01-23
Payer: COMMERCIAL

## 2025-01-23 NOTE — PROGRESS NOTES
Referral received from Dr. Elaine regarding [facility] Referral. Sw spoke with pt via phone call and discussed [facility] services and completed the request form. Pt was not given schedule yet and referral cannot be submitted until dates are provided. Sw to follow up with team regarding appointments. Sw to be available as needed.     Lina Morelos MSW, LSW  [id number]

## 2025-01-25 ENCOUNTER — ANESTHESIA EVENT (OUTPATIENT)
Dept: OPERATING ROOM | Facility: CLINIC | Age: 70
End: 2025-01-25
Payer: COMMERCIAL

## 2025-01-29 ENCOUNTER — ANESTHESIA (OUTPATIENT)
Dept: OPERATING ROOM | Facility: CLINIC | Age: 70
End: 2025-01-29
Payer: COMMERCIAL

## 2025-01-29 ENCOUNTER — HOSPITAL ENCOUNTER (OUTPATIENT)
Facility: CLINIC | Age: 70
Setting detail: OUTPATIENT SURGERY
Discharge: HOME | End: 2025-01-29
Attending: STUDENT IN AN ORGANIZED HEALTH CARE EDUCATION/TRAINING PROGRAM | Admitting: STUDENT IN AN ORGANIZED HEALTH CARE EDUCATION/TRAINING PROGRAM
Payer: COMMERCIAL

## 2025-01-29 VITALS
DIASTOLIC BLOOD PRESSURE: 82 MMHG | RESPIRATION RATE: 16 BRPM | BODY MASS INDEX: 23.3 KG/M2 | WEIGHT: 166.45 LBS | HEIGHT: 71 IN | HEART RATE: 78 BPM | OXYGEN SATURATION: 98 % | TEMPERATURE: 97.9 F | SYSTOLIC BLOOD PRESSURE: 124 MMHG

## 2025-01-29 DIAGNOSIS — C61 PROSTATE CANCER (MULTI): Primary | ICD-10-CM

## 2025-01-29 PROBLEM — I10 HTN (HYPERTENSION): Status: ACTIVE | Noted: 2025-01-29

## 2025-01-29 PROBLEM — E78.5 HYPERLIPIDEMIA: Status: ACTIVE | Noted: 2025-01-29

## 2025-01-29 PROCEDURE — 7100000009 HC PHASE TWO TIME - INITIAL BASE CHARGE: Performed by: STUDENT IN AN ORGANIZED HEALTH CARE EDUCATION/TRAINING PROGRAM

## 2025-01-29 PROCEDURE — A55874 PR TRANSPERINEAL PLACEMENT OF BIODEGRADABLE MATERIAL, PERI-PROSTATIC, SINGLE OR MULTIPLE: Performed by: ANESTHESIOLOGY

## 2025-01-29 PROCEDURE — 3700000001 HC GENERAL ANESTHESIA TIME - INITIAL BASE CHARGE: Performed by: STUDENT IN AN ORGANIZED HEALTH CARE EDUCATION/TRAINING PROGRAM

## 2025-01-29 PROCEDURE — 55876 PLACE RT DEVICE/MARKER PROS: CPT | Performed by: STUDENT IN AN ORGANIZED HEALTH CARE EDUCATION/TRAINING PROGRAM

## 2025-01-29 PROCEDURE — 7100000010 HC PHASE TWO TIME - EACH INCREMENTAL 1 MINUTE: Performed by: STUDENT IN AN ORGANIZED HEALTH CARE EDUCATION/TRAINING PROGRAM

## 2025-01-29 PROCEDURE — 2780000003 HC OR 278 NO HCPCS: Performed by: STUDENT IN AN ORGANIZED HEALTH CARE EDUCATION/TRAINING PROGRAM

## 2025-01-29 PROCEDURE — A55874 PR TRANSPERINEAL PLACEMENT OF BIODEGRADABLE MATERIAL, PERI-PROSTATIC, SINGLE OR MULTIPLE: Performed by: ANESTHESIOLOGIST ASSISTANT

## 2025-01-29 PROCEDURE — 2500000005 HC RX 250 GENERAL PHARMACY W/O HCPCS: Performed by: STUDENT IN AN ORGANIZED HEALTH CARE EDUCATION/TRAINING PROGRAM

## 2025-01-29 PROCEDURE — C1889 IMPLANT/INSERT DEVICE, NOC: HCPCS | Performed by: STUDENT IN AN ORGANIZED HEALTH CARE EDUCATION/TRAINING PROGRAM

## 2025-01-29 PROCEDURE — 55874 TPRNL PLMT BIODEGRDABL MATRL: CPT | Performed by: STUDENT IN AN ORGANIZED HEALTH CARE EDUCATION/TRAINING PROGRAM

## 2025-01-29 PROCEDURE — 3600000004 HC OR TIME - INITIAL BASE CHARGE - PROCEDURE LEVEL FOUR: Performed by: STUDENT IN AN ORGANIZED HEALTH CARE EDUCATION/TRAINING PROGRAM

## 2025-01-29 PROCEDURE — 3600000009 HC OR TIME - EACH INCREMENTAL 1 MINUTE - PROCEDURE LEVEL FOUR: Performed by: STUDENT IN AN ORGANIZED HEALTH CARE EDUCATION/TRAINING PROGRAM

## 2025-01-29 PROCEDURE — 76872 US TRANSRECTAL: CPT | Performed by: STUDENT IN AN ORGANIZED HEALTH CARE EDUCATION/TRAINING PROGRAM

## 2025-01-29 PROCEDURE — 3700000002 HC GENERAL ANESTHESIA TIME - EACH INCREMENTAL 1 MINUTE: Performed by: STUDENT IN AN ORGANIZED HEALTH CARE EDUCATION/TRAINING PROGRAM

## 2025-01-29 PROCEDURE — 2500000004 HC RX 250 GENERAL PHARMACY W/ HCPCS (ALT 636 FOR OP/ED): Performed by: STUDENT IN AN ORGANIZED HEALTH CARE EDUCATION/TRAINING PROGRAM

## 2025-01-29 PROCEDURE — 2500000004 HC RX 250 GENERAL PHARMACY W/ HCPCS (ALT 636 FOR OP/ED): Performed by: ANESTHESIOLOGIST ASSISTANT

## 2025-01-29 DEVICE — STERILE PLACEMENT NEEDLES (17GA ETW X 20CM) WITH BONE WAX AND (1.2 X 3MM) SOFT TISSUE GOLD MARKER [3]
Type: IMPLANTABLE DEVICE | Site: PERIANAL | Status: FUNCTIONAL
Brand: FIDUCIAL MARKER KIT

## 2025-01-29 RX ORDER — OXYCODONE HYDROCHLORIDE 5 MG/1
5 TABLET ORAL EVERY 4 HOURS PRN
Status: DISCONTINUED | OUTPATIENT
Start: 2025-01-29 | End: 2025-01-29 | Stop reason: HOSPADM

## 2025-01-29 RX ORDER — PROPOFOL 10 MG/ML
INJECTION, EMULSION INTRAVENOUS AS NEEDED
Status: DISCONTINUED | OUTPATIENT
Start: 2025-01-29 | End: 2025-01-29

## 2025-01-29 RX ORDER — MIDAZOLAM HYDROCHLORIDE 1 MG/ML
INJECTION, SOLUTION INTRAMUSCULAR; INTRAVENOUS AS NEEDED
Status: DISCONTINUED | OUTPATIENT
Start: 2025-01-29 | End: 2025-01-29

## 2025-01-29 RX ORDER — SODIUM CHLORIDE 9 MG/ML
INJECTION, SOLUTION INTRAMUSCULAR; INTRAVENOUS; SUBCUTANEOUS AS NEEDED
Status: DISCONTINUED | OUTPATIENT
Start: 2025-01-29 | End: 2025-01-29 | Stop reason: HOSPADM

## 2025-01-29 RX ORDER — SODIUM CHLORIDE, SODIUM LACTATE, POTASSIUM CHLORIDE, CALCIUM CHLORIDE 600; 310; 30; 20 MG/100ML; MG/100ML; MG/100ML; MG/100ML
INJECTION, SOLUTION INTRAVENOUS CONTINUOUS PRN
Status: DISCONTINUED | OUTPATIENT
Start: 2025-01-29 | End: 2025-01-29

## 2025-01-29 RX ORDER — METOCLOPRAMIDE HYDROCHLORIDE 5 MG/ML
10 INJECTION INTRAMUSCULAR; INTRAVENOUS ONCE AS NEEDED
Status: DISCONTINUED | OUTPATIENT
Start: 2025-01-29 | End: 2025-01-29 | Stop reason: HOSPADM

## 2025-01-29 RX ORDER — FENTANYL CITRATE 50 UG/ML
INJECTION, SOLUTION INTRAMUSCULAR; INTRAVENOUS AS NEEDED
Status: DISCONTINUED | OUTPATIENT
Start: 2025-01-29 | End: 2025-01-29

## 2025-01-29 RX ORDER — ONDANSETRON HYDROCHLORIDE 2 MG/ML
4 INJECTION, SOLUTION INTRAVENOUS ONCE AS NEEDED
Status: DISCONTINUED | OUTPATIENT
Start: 2025-01-29 | End: 2025-01-29 | Stop reason: HOSPADM

## 2025-01-29 RX ORDER — FENTANYL CITRATE 50 UG/ML
50 INJECTION, SOLUTION INTRAMUSCULAR; INTRAVENOUS EVERY 5 MIN PRN
Status: DISCONTINUED | OUTPATIENT
Start: 2025-01-29 | End: 2025-01-29 | Stop reason: HOSPADM

## 2025-01-29 RX ORDER — ALBUTEROL SULFATE 0.83 MG/ML
2.5 SOLUTION RESPIRATORY (INHALATION) ONCE AS NEEDED
Status: DISCONTINUED | OUTPATIENT
Start: 2025-01-29 | End: 2025-01-29 | Stop reason: HOSPADM

## 2025-01-29 RX ORDER — LIDOCAINE HYDROCHLORIDE 10 MG/ML
0.1 INJECTION, SOLUTION EPIDURAL; INFILTRATION; INTRACAUDAL; PERINEURAL ONCE
Status: DISCONTINUED | OUTPATIENT
Start: 2025-01-29 | End: 2025-01-29 | Stop reason: HOSPADM

## 2025-01-29 RX ORDER — LABETALOL HYDROCHLORIDE 5 MG/ML
5 INJECTION, SOLUTION INTRAVENOUS ONCE AS NEEDED
Status: DISCONTINUED | OUTPATIENT
Start: 2025-01-29 | End: 2025-01-29 | Stop reason: HOSPADM

## 2025-01-29 RX ORDER — SODIUM CHLORIDE 0.9 G/100ML
INJECTION, SOLUTION IRRIGATION AS NEEDED
Status: DISCONTINUED | OUTPATIENT
Start: 2025-01-29 | End: 2025-01-29 | Stop reason: HOSPADM

## 2025-01-29 RX ORDER — CEFAZOLIN SODIUM 2 G/50ML
2 SOLUTION INTRAVENOUS ONCE
Status: COMPLETED | OUTPATIENT
Start: 2025-01-29 | End: 2025-01-29

## 2025-01-29 RX ORDER — FENTANYL CITRATE 50 UG/ML
25 INJECTION, SOLUTION INTRAMUSCULAR; INTRAVENOUS EVERY 5 MIN PRN
Status: DISCONTINUED | OUTPATIENT
Start: 2025-01-29 | End: 2025-01-29 | Stop reason: HOSPADM

## 2025-01-29 RX ORDER — ACETAMINOPHEN 325 MG/1
650 TABLET ORAL EVERY 4 HOURS PRN
Status: DISCONTINUED | OUTPATIENT
Start: 2025-01-29 | End: 2025-01-29 | Stop reason: HOSPADM

## 2025-01-29 RX ORDER — KETOROLAC TROMETHAMINE 30 MG/ML
INJECTION, SOLUTION INTRAMUSCULAR; INTRAVENOUS AS NEEDED
Status: DISCONTINUED | OUTPATIENT
Start: 2025-01-29 | End: 2025-01-29

## 2025-01-29 RX ADMIN — FENTANYL CITRATE 50 MCG: 0.05 INJECTION, SOLUTION INTRAMUSCULAR; INTRAVENOUS at 11:56

## 2025-01-29 RX ADMIN — KETOROLAC TROMETHAMINE 30 MG: 30 INJECTION, SOLUTION INTRAMUSCULAR; INTRAVENOUS at 11:58

## 2025-01-29 RX ADMIN — SODIUM CHLORIDE, POTASSIUM CHLORIDE, SODIUM LACTATE AND CALCIUM CHLORIDE: 600; 310; 30; 20 INJECTION, SOLUTION INTRAVENOUS at 12:09

## 2025-01-29 RX ADMIN — CEFAZOLIN SODIUM 2 G: 2 SOLUTION INTRAVENOUS at 10:56

## 2025-01-29 RX ADMIN — FENTANYL CITRATE 50 MCG: 0.05 INJECTION, SOLUTION INTRAMUSCULAR; INTRAVENOUS at 11:51

## 2025-01-29 RX ADMIN — PROPOFOL 140 MCG/KG/MIN: 10 INJECTION, EMULSION INTRAVENOUS at 11:54

## 2025-01-29 RX ADMIN — PROPOFOL 50 MG: 10 INJECTION, EMULSION INTRAVENOUS at 11:51

## 2025-01-29 RX ADMIN — MIDAZOLAM 2 MG: 1 INJECTION INTRAMUSCULAR; INTRAVENOUS at 11:51

## 2025-01-29 SDOH — HEALTH STABILITY: MENTAL HEALTH: CURRENT SMOKER: 1

## 2025-01-29 ASSESSMENT — PAIN SCALES - GENERAL
PAINLEVEL_OUTOF10: 0 - NO PAIN
PAIN_LEVEL: 0
PAINLEVEL_OUTOF10: 0 - NO PAIN
PAINLEVEL_OUTOF10: 0 - NO PAIN

## 2025-01-29 ASSESSMENT — PAIN - FUNCTIONAL ASSESSMENT
PAIN_FUNCTIONAL_ASSESSMENT: 0-10

## 2025-01-29 ASSESSMENT — COLUMBIA-SUICIDE SEVERITY RATING SCALE - C-SSRS
2. HAVE YOU ACTUALLY HAD ANY THOUGHTS OF KILLING YOURSELF?: NO
1. IN THE PAST MONTH, HAVE YOU WISHED YOU WERE DEAD OR WISHED YOU COULD GO TO SLEEP AND NOT WAKE UP?: NO
6. HAVE YOU EVER DONE ANYTHING, STARTED TO DO ANYTHING, OR PREPARED TO DO ANYTHING TO END YOUR LIFE?: NO

## 2025-01-29 NOTE — DISCHARGE INSTRUCTIONS
Dr. Jolly - [facility]  Phone: [phone]    Follow-Up Information  For patients receiving spaceoar and Fiducial Markers, please follow up with Radiology Oncology as scheduled for Planning.    Medication  Please take the medications as prescribed by your doctor. Tylenol or non-steroids! anti-inflammatory medications (such as Aleve®) should relieve mild pain and discomfort. Resume the usual medications you took before surgery unless instructed otherwise.    Resuming Activities and Driving  *NO Driving on the day of surgery  *You may resume driving the day after surgery  *You may resume normal activities as tolerated  *You may shower     Diet  You may resume your normal diet once at home, with no additional considerations.    Expected Signs and Symptoms  You may have a small amount of bleeding with urination on occasion. This may be accompanied with small blood clots. This is normal and should be relieved by increasing your fluid intake.  You may experience some mild burning and discomfort during urination. This is normal and should subside in one to two weeks.      When to Call Your Doctor - Dr. Jolly [phone]  Please call the office immediately if any of the following symptoms appear:    *Inability to eat, drink, or take medication  *Persistent Nausea or Vomiting  *Fever over 100.4 degrees F. (38 degrees C.)    Please call 9-1-1 if you experience any of the following:  *Chest Pain, Shortness of Breath or Difficulty Breathing  *Sudden one sided weakness/slurred speech/ any signs or symptoms of a stroke  *Severe headache or visual disturbance    Additional Home-Going Instructions for Adults Who Have Had Anesthesia or Sedatives:    The anesthetics, sedatives and pain killers which were given to you will be acting in your body for the next 24 hours.  This may cause you to feel sleepy.  This feeling will slowly wear off.    For the next 24 hours you SHOULD NOT:  *Drive a car, or operate machinery or power  tools  *Drink any form of alcohol (including beer or wine)  *Make any important Decisions

## 2025-01-29 NOTE — ANESTHESIA PREPROCEDURE EVALUATION
Patient: Paul Malone    Procedure Information       Date/Time: 01/29/25 5640    Procedure: Insertion Fiducial Marker Prostate - spaceoar and fiducials    [place] [facility] [place] [facility] [place] [facility] [place]    Surgeons: Janak Jolly MD            Relevant Problems   Anesthesia (within normal limits)      Cardiac   (+) HTN (hypertension)   (+) Hyperlipidemia      Pulmonary (within normal limits)      Neuro (within normal limits)      GI (within normal limits)      /Renal   (+) Benign localized hyperplasia of prostate with urinary retention   (+) Prostate cancer (Multi)      Liver (within normal limits)      Endocrine (within normal limits)       Clinical information reviewed:   Tobacco  Allergies  Meds   Med Hx  Surg Hx   Fam Hx          NPO Detail:  No data recorded     Physical Exam    Airway  Mallampati: II  TM distance: >3 FB  Neck ROM: full     Cardiovascular - normal exam     Dental        Pulmonary - normal exam     Abdominal - normal exam       Other findings: Poor dentition        Anesthesia Plan    History of general anesthesia?: yes  History of complications of general anesthesia?: no    ASA 3     MAC     The patient is a current smoker.  Patient was previously instructed to abstain from smoking on day of procedure.  Patient did not smoke on day of procedure.    intravenous induction   Anesthetic plan and risks discussed with patient.  Use of blood products discussed with patient who.    Plan discussed with [facility].

## 2025-01-29 NOTE — ANESTHESIA POSTPROCEDURE EVALUATION
Patient: Paul Malone    Procedure Summary       Date: 01/29/25 Room / Location: [facility] [place] [facility] OR 01 / Virtual [facility] [place] [facility] OR    Anesthesia Start: 1147 Anesthesia Stop: 1219    Procedure: Insertion Fiducial Marker Prostate Diagnosis:       Prostate cancer (Multi)      (Prostate cancer (Multi) [C61])    Surgeons: Janak Jolly MD Responsible Provider: Dacia Ramirez MD    Anesthesia Type: MAC ASA Status: 3            Anesthesia Type: MAC    Vitals Value Taken Time   BP 88/54 01/29/25 1218   Temp 36.6 °C (97.9 °F) 01/29/25 1218   Pulse 72 01/29/25 1218   Resp 16 01/29/25 1218   SpO2 98 % 01/29/25 1218       Anesthesia Post Evaluation    Patient location during evaluation: PACU  Patient participation: complete - patient participated  Level of consciousness: awake  Pain score: 0  Pain management: adequate  Airway patency: patent  Cardiovascular status: acceptable  Respiratory status: acceptable  Hydration status: acceptable  Postoperative Nausea and Vomiting: none        There were no known notable events for this encounter.

## 2025-01-29 NOTE — H&P
History Of Present Illness  Name/Age: Paul Malone, 69    PSA: 0.97 (12/19/24)    MRI: 1.1cm PIRADS4 left apical posterior PZ, no EPE, SVI, lymphadenopathy (9/12/24)  Size: 18g, PSAD 0.1    Past MRI/Path:   2022 HoLEP path low GG1  2024 bx - GG2 DEWAYNE, right posteromedial, left posterolateral, GG1 left posteromedial, right base    PMH: HTN, HLD, CAD, COPD, PE x2 (after back surgery), IBS-C, RLS, anxiety/depression, OA, ED, BPH s/p HoLEP 5/5/2022 (Dr. Medrano)    FamHx: unknown    sCr: 0.74    Blood thinners: asa81     Past Medical History  Past Medical History:   Diagnosis Date    Arthritis     CAD (coronary artery disease)     Clotting disorder (Multi)     Compression fracture of lumbar vertebra (Multi)     compressed disk fractures, lumbar per patient    COPD (chronic obstructive pulmonary disease) (Multi)     Fatty liver     HTN (hypertension)     Hypercholesteremia     IBS (irritable bowel syndrome)     Presence of other specified devices 04/12/2022    Chronic indwelling Seo catheter    Prostate cancer (Multi)     Pulmonary embolism     RLS (restless legs syndrome)        Surgical History  Past Surgical History:   Procedure Laterality Date    CYSTOSCOPY      PROSTATE SURGERY  05/05/2022    HOLEP    SPINE SURGERY      TONSILLECTOMY          Social History  He reports that he has been smoking cigarettes. He has never used smokeless tobacco. He reports that he does not currently use alcohol. He reports that he does not use drugs.    Family History  Family History   Problem Relation Name Age of Onset    Prostate cancer Brother          Proton RT        Allergies  Patient has no known allergies.    Physical Exam  Constitutional: Alert, NAD  HEENT: normocephalic, atraumatic EOMI  Neuro: A&O x3  CV: rate is regular  Pulm: Non-laboured breathing  GI: abdomen soft, non-tender, non-distended  : Voiding spontaneously  Skin: No lesions or discoloration noted.  Musculoskeletal: MARISSA  Extremities: no edema or cyanosis  noted     Last Recorded Vitals  Weight 75.3 kg (166 lb).    Relevant Results  Lab Results   Component Value Date    WBC 10.4 04/27/2022    HGB 15.1 04/27/2022    HCT 43.9 04/27/2022    MCV 93 04/27/2022     04/27/2022     Lab Results   Component Value Date    GLUCOSE 86 10/22/2024    CALCIUM 9.6 10/22/2024     10/22/2024    K 5.0 10/22/2024    CO2 27 10/22/2024     10/22/2024    BUN 16 10/22/2024    CREATININE 0.74 10/22/2024       Assessment: GG2 prostate cancer    Plan: Fiducial and SpaceOAR placement      Mimi Wright PA-C

## 2025-01-29 NOTE — OP NOTE
Insertion Fiducial Marker Prostate Operative Note     Date: 2025  OR Location: [facility] [place] [place] [facility]    Name: Paul Malone, : 1955, Age: 69 y.o., MRN: [id number], Sex: male    Diagnosis  Pre-op Diagnosis      * Prostate cancer (Multi) [C61] Post-op Diagnosis     * Prostate cancer (Multi) [C61]     Procedures  Insertion Fiducial Marker Prostate  38114 - IN PLMT INTERSTITIAL DEV RADIAT TX PROSTATE 1/MULT    IN TRANSPERINEAL PLMT BIODEGRADABLE MATRL 1/MLT NJX [40476]  Surgeons      * Janak Jolly - Primary    Resident/Fellow/Other Assistant:  Surgeons and Role:     * Mimi Wright PA-C - BRITT First Assist         Preoperative Diagnosis  Prostate cancer.       Postoperative Diagnosis  Same.       Procedure Performed  SpaceOAR and Fiducial placement, Transrectal ultrasound .   Surgeon  Janak Jolly MD ([id number]) - Urology.   Assisted by  N/A.      Details of Procedure     Anesthesia  MAC.   Estimated Blood Loss (ml)  5.   Specimen(s) Removed  None.   Drain(s)  None.   Implant(s)  None.   Complications  None.   Indications (History)  Prostate cancer.   Findings of Procedure  18g heterogenous.   Description of Procedure  After administration of anesthesia and antibiotics, the patient was placed in the dorsal lithotomy position and prepped and draped in the usual sterile fashion. A prostate block was performed and transrectal ultrasound using a stepper. The prostate was measured. There were some hypoechoic areas. Fiducials were placed at the base, apex and mid prostate. The perirectal fat was identified using the finder needle with excellent dispersion of saline at the mid prostate. SpaceOAR Andrew was then placed in this space. Excellent placement was confirmed in both the transverse and saggital planes. There was no puncture of the rectal wall during the procedure. .

## 2025-02-04 ENCOUNTER — SOCIAL WORK (OUTPATIENT)
Dept: CASE MANAGEMENT | Facility: HOSPITAL | Age: 70
End: 2025-02-04
Payer: COMMERCIAL

## 2025-02-04 NOTE — PROGRESS NOTES
Lodging Referral    Referral Source: Rad/Onc  Reason for Lodging: Distance  Dates Needed: 2/26-3/7  Ambulation: Independently  Caregiver: Anika Maher   Special Accommodations: None  Lodging Location: [place]  Referral submitted via Email on 02/04/25    Backup Lodging needed: No  Referral submitted N/A      Pt agreeable to plan. SW will continue to follow as needed.

## 2025-02-07 ENCOUNTER — HOSPITAL ENCOUNTER (OUTPATIENT)
Dept: RADIOLOGY | Facility: EXTERNAL LOCATION | Age: 70
Discharge: HOME | End: 2025-02-07

## 2025-02-07 ENCOUNTER — APPOINTMENT (OUTPATIENT)
Dept: RADIATION ONCOLOGY | Facility: HOSPITAL | Age: 70
End: 2025-02-07
Payer: COMMERCIAL

## 2025-02-07 ENCOUNTER — APPOINTMENT (OUTPATIENT)
Dept: RADIOLOGY | Facility: HOSPITAL | Age: 70
End: 2025-02-07
Payer: COMMERCIAL

## 2025-02-07 DIAGNOSIS — C61 PROSTATE CANCER (MULTI): ICD-10-CM

## 2025-02-07 DIAGNOSIS — C61 PROSTATE CANCER (MULTI): Primary | ICD-10-CM

## 2025-02-12 ENCOUNTER — HOSPITAL ENCOUNTER (OUTPATIENT)
Dept: RADIOLOGY | Facility: EXTERNAL LOCATION | Age: 70
Discharge: HOME | End: 2025-02-12

## 2025-02-12 ENCOUNTER — HOSPITAL ENCOUNTER (OUTPATIENT)
Dept: RADIATION ONCOLOGY | Facility: HOSPITAL | Age: 70
Setting detail: RADIATION/ONCOLOGY SERIES
Discharge: HOME | End: 2025-02-12
Payer: COMMERCIAL

## 2025-02-12 ENCOUNTER — HOSPITAL ENCOUNTER (OUTPATIENT)
Dept: RADIOLOGY | Facility: HOSPITAL | Age: 70
Discharge: HOME | End: 2025-02-12
Payer: COMMERCIAL

## 2025-02-12 DIAGNOSIS — C61 PROSTATE CANCER (MULTI): ICD-10-CM

## 2025-02-12 DIAGNOSIS — C61 MALIGNANT NEOPLASM OF PROSTATE (MULTI): Primary | ICD-10-CM

## 2025-02-12 DIAGNOSIS — C61 MALIGNANT NEOPLASM OF PROSTATE (MULTI): ICD-10-CM

## 2025-02-12 PROCEDURE — 77290 THER RAD SIMULAJ FIELD CPLX: CPT | Performed by: STUDENT IN AN ORGANIZED HEALTH CARE EDUCATION/TRAINING PROGRAM

## 2025-02-12 PROCEDURE — 77334 RADIATION TREATMENT AID(S): CPT | Performed by: STUDENT IN AN ORGANIZED HEALTH CARE EDUCATION/TRAINING PROGRAM

## 2025-02-18 ENCOUNTER — HOSPITAL ENCOUNTER (OUTPATIENT)
Dept: RADIATION ONCOLOGY | Facility: HOSPITAL | Age: 70
Setting detail: RADIATION/ONCOLOGY SERIES
Discharge: HOME | End: 2025-02-18
Payer: COMMERCIAL

## 2025-02-18 PROCEDURE — 77295 3-D RADIOTHERAPY PLAN: CPT | Performed by: STUDENT IN AN ORGANIZED HEALTH CARE EDUCATION/TRAINING PROGRAM

## 2025-02-18 PROCEDURE — 77300 RADIATION THERAPY DOSE PLAN: CPT | Performed by: STUDENT IN AN ORGANIZED HEALTH CARE EDUCATION/TRAINING PROGRAM

## 2025-02-18 PROCEDURE — 77334 RADIATION TREATMENT AID(S): CPT | Performed by: STUDENT IN AN ORGANIZED HEALTH CARE EDUCATION/TRAINING PROGRAM

## 2025-02-19 ENCOUNTER — HOSPITAL ENCOUNTER (OUTPATIENT)
Dept: RADIATION ONCOLOGY | Facility: HOSPITAL | Age: 70
Setting detail: RADIATION/ONCOLOGY SERIES
Discharge: HOME | End: 2025-02-19
Payer: COMMERCIAL

## 2025-02-19 PROCEDURE — 77370 RADIATION PHYSICS CONSULT: CPT | Performed by: STUDENT IN AN ORGANIZED HEALTH CARE EDUCATION/TRAINING PROGRAM

## 2025-02-25 ENCOUNTER — APPOINTMENT (OUTPATIENT)
Dept: RADIATION ONCOLOGY | Facility: HOSPITAL | Age: 70
End: 2025-02-25
Payer: COMMERCIAL

## 2025-02-27 ENCOUNTER — APPOINTMENT (OUTPATIENT)
Dept: RADIATION ONCOLOGY | Facility: HOSPITAL | Age: 70
End: 2025-02-27
Payer: COMMERCIAL

## 2025-03-03 ENCOUNTER — APPOINTMENT (OUTPATIENT)
Dept: RADIATION ONCOLOGY | Facility: HOSPITAL | Age: 70
End: 2025-03-03
Payer: COMMERCIAL

## 2025-03-04 ENCOUNTER — APPOINTMENT (OUTPATIENT)
Dept: RADIATION ONCOLOGY | Facility: HOSPITAL | Age: 70
End: 2025-03-04
Payer: COMMERCIAL

## 2025-03-05 ENCOUNTER — APPOINTMENT (OUTPATIENT)
Dept: RADIATION ONCOLOGY | Facility: HOSPITAL | Age: 70
End: 2025-03-05
Payer: COMMERCIAL

## 2025-03-06 ENCOUNTER — APPOINTMENT (OUTPATIENT)
Dept: RADIATION ONCOLOGY | Facility: HOSPITAL | Age: 70
End: 2025-03-06
Payer: COMMERCIAL

## 2025-03-07 ENCOUNTER — APPOINTMENT (OUTPATIENT)
Dept: RADIATION ONCOLOGY | Facility: HOSPITAL | Age: 70
End: 2025-03-07
Payer: COMMERCIAL

## 2025-03-10 ENCOUNTER — APPOINTMENT (OUTPATIENT)
Dept: RADIATION ONCOLOGY | Facility: HOSPITAL | Age: 70
End: 2025-03-10
Payer: COMMERCIAL

## 2025-03-11 ENCOUNTER — HOSPITAL ENCOUNTER (OUTPATIENT)
Dept: RADIATION ONCOLOGY | Facility: HOSPITAL | Age: 70
Setting detail: RADIATION/ONCOLOGY SERIES
Discharge: HOME | End: 2025-03-11
Payer: COMMERCIAL

## 2025-03-11 DIAGNOSIS — C61 MALIGNANT NEOPLASM OF PROSTATE (MULTI): ICD-10-CM

## 2025-03-11 DIAGNOSIS — Z51.0 ENCOUNTER FOR ANTINEOPLASTIC RADIATION THERAPY: ICD-10-CM

## 2025-03-11 LAB
RAD ONC MSQ ACTUAL FRACTIONS DELIVERED: 1
RAD ONC MSQ ACTUAL SESSION DELIVERED DOSE: 800 CGRAY
RAD ONC MSQ ACTUAL TOTAL DOSE: 800 CGRAY
RAD ONC MSQ ELAPSED DAYS: 0
RAD ONC MSQ LAST DATE: NORMAL
RAD ONC MSQ PRESCRIBED FRACTIONAL DOSE: 800 CGRAY
RAD ONC MSQ PRESCRIBED NUMBER OF FRACTIONS: 5
RAD ONC MSQ PRESCRIBED TECHNIQUE: NORMAL
RAD ONC MSQ PRESCRIBED TOTAL DOSE: 4000 CGRAY
RAD ONC MSQ PRESCRIPTION PATTERN COMMENT: NORMAL
RAD ONC MSQ START DATE: NORMAL
RAD ONC MSQ TREATMENT COURSE NUMBER: 1
RAD ONC MSQ TREATMENT SITE: NORMAL

## 2025-03-11 PROCEDURE — 77280 THER RAD SIMULAJ FIELD SMPL: CPT | Performed by: STUDENT IN AN ORGANIZED HEALTH CARE EDUCATION/TRAINING PROGRAM

## 2025-03-11 PROCEDURE — 77373 STRTCTC BDY RAD THER TX DLVR: CPT | Performed by: STUDENT IN AN ORGANIZED HEALTH CARE EDUCATION/TRAINING PROGRAM

## 2025-03-12 ENCOUNTER — APPOINTMENT (OUTPATIENT)
Dept: RADIATION ONCOLOGY | Facility: HOSPITAL | Age: 70
End: 2025-03-12
Payer: COMMERCIAL

## 2025-03-13 ENCOUNTER — HOSPITAL ENCOUNTER (OUTPATIENT)
Dept: RADIATION ONCOLOGY | Facility: HOSPITAL | Age: 70
Setting detail: RADIATION/ONCOLOGY SERIES
Discharge: HOME | End: 2025-03-13
Payer: COMMERCIAL

## 2025-03-13 DIAGNOSIS — Z51.0 ENCOUNTER FOR ANTINEOPLASTIC RADIATION THERAPY: ICD-10-CM

## 2025-03-13 DIAGNOSIS — C61 MALIGNANT NEOPLASM OF PROSTATE (MULTI): ICD-10-CM

## 2025-03-13 LAB
RAD ONC MSQ ACTUAL FRACTIONS DELIVERED: 2
RAD ONC MSQ ACTUAL SESSION DELIVERED DOSE: 800 CGRAY
RAD ONC MSQ ACTUAL TOTAL DOSE: 1600 CGRAY
RAD ONC MSQ ELAPSED DAYS: 2
RAD ONC MSQ LAST DATE: NORMAL
RAD ONC MSQ PRESCRIBED FRACTIONAL DOSE: 800 CGRAY
RAD ONC MSQ PRESCRIBED NUMBER OF FRACTIONS: 5
RAD ONC MSQ PRESCRIBED TECHNIQUE: NORMAL
RAD ONC MSQ PRESCRIBED TOTAL DOSE: 4000 CGRAY
RAD ONC MSQ PRESCRIPTION PATTERN COMMENT: NORMAL
RAD ONC MSQ START DATE: NORMAL
RAD ONC MSQ TREATMENT COURSE NUMBER: 1
RAD ONC MSQ TREATMENT SITE: NORMAL

## 2025-03-13 PROCEDURE — 77373 STRTCTC BDY RAD THER TX DLVR: CPT | Performed by: RADIOLOGY

## 2025-03-14 ENCOUNTER — APPOINTMENT (OUTPATIENT)
Dept: RADIATION ONCOLOGY | Facility: HOSPITAL | Age: 70
End: 2025-03-14
Payer: COMMERCIAL

## 2025-03-17 ENCOUNTER — HOSPITAL ENCOUNTER (OUTPATIENT)
Dept: RADIATION ONCOLOGY | Facility: HOSPITAL | Age: 70
Setting detail: RADIATION/ONCOLOGY SERIES
Discharge: HOME | End: 2025-03-17
Payer: COMMERCIAL

## 2025-03-17 DIAGNOSIS — C61 MALIGNANT NEOPLASM OF PROSTATE (MULTI): ICD-10-CM

## 2025-03-17 DIAGNOSIS — Z51.0 ENCOUNTER FOR ANTINEOPLASTIC RADIATION THERAPY: ICD-10-CM

## 2025-03-17 LAB
RAD ONC MSQ ACTUAL FRACTIONS DELIVERED: 3
RAD ONC MSQ ACTUAL SESSION DELIVERED DOSE: 800 CGRAY
RAD ONC MSQ ACTUAL TOTAL DOSE: 2400 CGRAY
RAD ONC MSQ ELAPSED DAYS: 6
RAD ONC MSQ LAST DATE: NORMAL
RAD ONC MSQ PRESCRIBED FRACTIONAL DOSE: 800 CGRAY
RAD ONC MSQ PRESCRIBED NUMBER OF FRACTIONS: 5
RAD ONC MSQ PRESCRIBED TECHNIQUE: NORMAL
RAD ONC MSQ PRESCRIBED TOTAL DOSE: 4000 CGRAY
RAD ONC MSQ PRESCRIPTION PATTERN COMMENT: NORMAL
RAD ONC MSQ START DATE: NORMAL
RAD ONC MSQ TREATMENT COURSE NUMBER: 1
RAD ONC MSQ TREATMENT SITE: NORMAL

## 2025-03-17 PROCEDURE — 77373 STRTCTC BDY RAD THER TX DLVR: CPT | Performed by: INTERNAL MEDICINE

## 2025-03-19 ENCOUNTER — HOSPITAL ENCOUNTER (OUTPATIENT)
Dept: RADIATION ONCOLOGY | Facility: HOSPITAL | Age: 70
Setting detail: RADIATION/ONCOLOGY SERIES
Discharge: HOME | End: 2025-03-19
Payer: COMMERCIAL

## 2025-03-19 DIAGNOSIS — C61 MALIGNANT NEOPLASM OF PROSTATE (MULTI): ICD-10-CM

## 2025-03-19 DIAGNOSIS — Z51.0 ENCOUNTER FOR ANTINEOPLASTIC RADIATION THERAPY: ICD-10-CM

## 2025-03-19 LAB
RAD ONC MSQ ACTUAL FRACTIONS DELIVERED: 4
RAD ONC MSQ ACTUAL SESSION DELIVERED DOSE: 800 CGRAY
RAD ONC MSQ ACTUAL TOTAL DOSE: 3200 CGRAY
RAD ONC MSQ ELAPSED DAYS: 8
RAD ONC MSQ LAST DATE: NORMAL
RAD ONC MSQ PRESCRIBED FRACTIONAL DOSE: 800 CGRAY
RAD ONC MSQ PRESCRIBED NUMBER OF FRACTIONS: 5
RAD ONC MSQ PRESCRIBED TECHNIQUE: NORMAL
RAD ONC MSQ PRESCRIBED TOTAL DOSE: 4000 CGRAY
RAD ONC MSQ PRESCRIPTION PATTERN COMMENT: NORMAL
RAD ONC MSQ START DATE: NORMAL
RAD ONC MSQ TREATMENT COURSE NUMBER: 1
RAD ONC MSQ TREATMENT SITE: NORMAL

## 2025-03-19 PROCEDURE — 77373 STRTCTC BDY RAD THER TX DLVR: CPT | Performed by: STUDENT IN AN ORGANIZED HEALTH CARE EDUCATION/TRAINING PROGRAM

## 2025-03-19 PROCEDURE — 77336 RADIATION PHYSICS CONSULT: CPT | Performed by: STUDENT IN AN ORGANIZED HEALTH CARE EDUCATION/TRAINING PROGRAM

## 2025-03-21 ENCOUNTER — HOSPITAL ENCOUNTER (OUTPATIENT)
Dept: RADIATION ONCOLOGY | Facility: HOSPITAL | Age: 70
Setting detail: RADIATION/ONCOLOGY SERIES
Discharge: HOME | End: 2025-03-21
Payer: COMMERCIAL

## 2025-03-21 ENCOUNTER — DOCUMENTATION (OUTPATIENT)
Dept: RADIATION ONCOLOGY | Facility: HOSPITAL | Age: 70
End: 2025-03-21

## 2025-03-21 VITALS
HEART RATE: 72 BPM | WEIGHT: 179.12 LBS | SYSTOLIC BLOOD PRESSURE: 157 MMHG | RESPIRATION RATE: 18 BRPM | TEMPERATURE: 97 F | DIASTOLIC BLOOD PRESSURE: 95 MMHG | BODY MASS INDEX: 24.98 KG/M2 | OXYGEN SATURATION: 99 %

## 2025-03-21 DIAGNOSIS — C61 PROSTATE CANCER (MULTI): Primary | ICD-10-CM

## 2025-03-21 DIAGNOSIS — R39.12 WEAK URINARY STREAM: ICD-10-CM

## 2025-03-21 DIAGNOSIS — Z51.0 ENCOUNTER FOR ANTINEOPLASTIC RADIATION THERAPY: ICD-10-CM

## 2025-03-21 DIAGNOSIS — C61 MALIGNANT NEOPLASM OF PROSTATE (MULTI): ICD-10-CM

## 2025-03-21 LAB
RAD ONC MSQ ACTUAL FRACTIONS DELIVERED: 5
RAD ONC MSQ ACTUAL SESSION DELIVERED DOSE: 800 CGRAY
RAD ONC MSQ ACTUAL TOTAL DOSE: 4000 CGRAY
RAD ONC MSQ ELAPSED DAYS: 10
RAD ONC MSQ LAST DATE: NORMAL
RAD ONC MSQ PRESCRIBED FRACTIONAL DOSE: 800 CGRAY
RAD ONC MSQ PRESCRIBED NUMBER OF FRACTIONS: 5
RAD ONC MSQ PRESCRIBED TECHNIQUE: NORMAL
RAD ONC MSQ PRESCRIBED TOTAL DOSE: 4000 CGRAY
RAD ONC MSQ PRESCRIPTION PATTERN COMMENT: NORMAL
RAD ONC MSQ START DATE: NORMAL
RAD ONC MSQ TREATMENT COURSE NUMBER: 1
RAD ONC MSQ TREATMENT SITE: NORMAL

## 2025-03-21 PROCEDURE — 77373 STRTCTC BDY RAD THER TX DLVR: CPT | Performed by: RADIOLOGY

## 2025-03-21 RX ORDER — TAMSULOSIN HYDROCHLORIDE 0.4 MG/1
0.4 CAPSULE ORAL DAILY
Qty: 90 CAPSULE | Refills: 0 | Status: SHIPPED | OUTPATIENT
Start: 2025-03-21 | End: 2025-06-19

## 2025-03-21 ASSESSMENT — PAIN SCALES - GENERAL: PAINLEVEL_OUTOF10: 0-NO PAIN

## 2025-03-21 NOTE — PROGRESS NOTES
RADIATION ONCOLOGY ON-TREATMENT VISIT NOTE  Patient Name:  Paul Malone  MRN:  [id number]  :  1955    Radiation Oncologist: Maren Elaine MD PhD  Primary Care Provider: Scot Coates DO  Care Team: Patient Care Team:  Scot Coates DO as PCP - General  Zulma Velez, ANP-CNP as Nurse Practitioner (Family Medicine)    Date of Service: 3/21/2025     Paul Malone is a 69 y.o.-year-old with:   Cancer Staging   Prostate cancer (Multi)  Staging form: Prostate, AJCC 8th Edition  - Clinical stage from 2024: Stage IIC (cT1c, cN0, cM0, PSA: 1.9, Grade Group: 3) - Signed by Maren Elaine MD PhD on 2024  Prognostic indicators: Decipher 0.41 (low)    Specialty Problems          Radiation Oncology Problems    Prostate cancer (Multi)         Treatment Summary:  Radiation Therapy    Treatment Period Technique Fraction Dose Fractions Total Dose   Course 1 3/11/2025-3/21/2025  (days elapsed: 10)         ProstSV 3/11/2025-3/21/2025 SBRT 800 / 800 cGy 5 / 5 4000 / 4,000 cGy     Concurrent systemic therapy: none    SUBJECTIVE: Feeling well. Denies any bowel changes compared to baseline. Increased urinary urgency and frequency.      OBJECTIVE:   Vital Signs:  BP (!) 157/95   Pulse 72   Temp 36.1 °C (97 °F) (Temporal)   Resp 18   Wt 81.3 kg (179 lb 2 oz)   SpO2 99%   BMI 24.98 kg/m²     Toxicity Assessment          3/21/2025    11:00   Toxicity Assessment   Diarrhea Grade 0   Constipation Grade 2   Hematuria Grade 0   Urinary Incontinence Grade 1       baseline stress incontinence unchanged   Erectile Dysfunction Grade 2   Urinary Frequency Grade 1   Urinary Retention Grade 2       history of HoLEP 2022, now with wrosening stream after RT   Urinary Urgency Grade 1      ASSESSMENT/PLAN:  The patient is tolerating radiation therapy as anticipated. Treatment complete, follow-up as scheduled.   Flomax prescribed  RTC in 3m, with demetra Owen prior    Maren Elaine  , Radiation  Oncology

## 2025-03-27 NOTE — PROGRESS NOTES
RADIATION COMPLETION OF THERAPY NOTE    Patient Name:  Paul Malone  MRN:  [id number]  :  1955    Radiation Oncologist: Maren Elaine MD PhD  Primary Care Provider: Scot Coates DO    Brief History: Paul Malone is a 69 y.o. male with Cancer Staging   Prostate cancer (Multi)  Staging form: Prostate, AJCC 8th Edition  - Clinical stage from 2024: Stage IIC (cT1c, cN0, cM0, PSA: 1.9, Grade Group: 3) - Signed by Maren Elaine MD PhD on 2024  Prognostic indicators: Decipher 0.41 (low)      The patient completed radiotherapy as outlined below.    Radiation Treatment Summary:    Radiation Therapy    Treatment Period Technique Fraction Dose Fractions Total Dose   Course 1 3/11/2025-3/21/2025  (days elapsed: 10)         ProstSV 3/11/2025-3/21/2025 SBRT 800 / 800 cGy 5 / 5 4000 / 4,000 cGy     Concurrent Systemic Therapy: none    CTCAE Toxicity Overview:   Toxicity Assessment          3/21/2025    11:00   Toxicity Assessment   Adverse Events Reviewed (WDL) No (Exceptions to WDL)   Treatment Site Prostate RT   Diarrhea Grade 0   Constipation Grade 2   Hematuria Grade 0   Urinary Incontinence Grade 1       baseline stress incontinence unchanged   Erectile Dysfunction Grade 2   Urinary Frequency Grade 1       mainly at night, nocturia x 3-4   Urinary Retention Grade 2       history of HoLEP 2022, now with wrosening stream after RT   Urinary Urgency Grade 1     Patient Disposition: The patient is scheduled for follow up at our clinic on 2025 with Tereso Owen NP. The patient is encouraged to contact the radiation department for any questions or concerns in the interim.    Future Appointments   Date Time Provider Department Center   2025  1:00 PM SHREYA Daniel-CNP [id number]

## 2025-06-18 ENCOUNTER — TELEPHONE (OUTPATIENT)
Dept: RADIATION ONCOLOGY | Facility: HOSPITAL | Age: 70
End: 2025-06-18
Payer: COMMERCIAL

## 2025-06-18 NOTE — TELEPHONE ENCOUNTER
Called pt. to remind of appointment on 6/19/2025 at 1:00 pm with MAYURI Owen. Pt's phone went to voicemail left number if needs to reschedule.

## 2025-06-18 NOTE — PROGRESS NOTES
"Cancer synopsis:  Rad/onc: Dr. Elaine/Brayden DAVIDP    69 y/o male w/ Stage IIC (cT1c, cN0, cM0, PSA: 1.9, Grade Group: 3)     Prostate ca hx:  5/5/2022 HoLEP, pathology noted low GG1 prostate adenocarcinoma     Per chart review, PSA of 1.9     9/12/2024 MRI prostate noted 18.9 g gland, with near-total resection of the TZ.  PI-RADS 4 lesion in the left PZ at apex, no SV invasion or EPE.     9/20/2024 CT C/A/P with IV contrast in setting of blunt chest trauma noted rib fracture of right 6th, 7th, and 9th ribs.  No abnormal lymphadenopathy.     11/12/2024 systematic and targeted biopsy noted GG3, 4/12 cores+, 1/1 targeted core+ (GG3 in target, left posterior)     12/19/2024 PSMA PET noted uptake within the prostate, corresponding to the PI-RADS 4 lesion, SUV 11.  No evidence of avidity within SV or nodes or distant disease.     03/21/2025: SBRT to prostate     History of presenting illness:    Patient ID: [id number]     Paul Malone is a 70 y.o. male who presents for his UIR prostate ca Stage IIC (cT1c, cN0, cM0, PSA: 1.9, Grade Group: 3) now s/p SBRT to prostate, declined ADT.    RT Site: prostate   RT Date: 03/21/2025  Hormone therapy: No  Hot Flushes:Denies  Fatigue: Admits to some lingering fatigue, states feels like he is missing \"Vigger\" he typically  Bone pain: Denies  ED: not active per patient  - Quality of erections during the last 4 weeks: 1 = None at all  - Use of erectile dysfunction medications:  None  IPSS: assigned to pt to complete  Urinary symptoms: Admits to increased urgency however at times some urinary hesitancy. Denies dysuria, hematuria or leakage. Sees urology and has had cysto in the past.   Urinary Medications: flowmax daily  Rectal bleeding: Denies  Colonoscopy: 06/2021  Other systems: Denies SOB, CP or fever    Review of systems:  Review of Systems   Constitutional:  Negative for fatigue, fever and unexpected weight change.   Respiratory:  Negative for cough, chest tightness and shortness of " breath.    Cardiovascular:  Negative for chest pain, palpitations and leg swelling.   Gastrointestinal:  Negative for abdominal pain, anal bleeding, blood in stool, constipation, diarrhea and rectal pain.   Endocrine: Negative for cold intolerance, heat intolerance and polyuria.   Genitourinary:  Positive for difficulty urinating and frequency. Negative for decreased urine volume, dysuria, hematuria and urgency.   Musculoskeletal:  Negative for arthralgias, back pain, gait problem and joint swelling.   Skin: Negative.    Allergic/Immunologic: Negative.    Neurological:  Negative for dizziness, syncope and weakness.   Psychiatric/Behavioral: Negative.       PERFORMANCE STATUS:  KPS/ECO, Fully active, able to carry on all pre-disease performed without restriction (ECOG equivalent 0)    Past Medical history  Medical History[1]     Surgical/family history  Family History[2]   Surgical History[3]     Social History  Tobacco Use: High Risk (2025)    Received from [facility]    Patient History     Smoking Tobacco Use: Every Day     Smokeless Tobacco Use: Never     Passive Exposure: Not on file       Current med list:  Current Outpatient Medications   Medication Instructions    aspirin 81 mg, Daily    carvedilol (Coreg) 3.125 mg tablet 1 tablet, 2 times daily    ezetimibe (ZETIA) 10 mg, Daily    lisinopril 10 mg, Daily    nicotine (Nicoderm CQ) 14 mg/24 hr patch 1 patch, transdermal, Every 24 hours, Begin with 14 mg/day patch for 6 weeks, followed by the 7 mg/day patch for 2 weeks.    nicotine (Nicoderm CQ) 7 mg/24 hr patch 1 patch, transdermal, Every 24 hours, Begin with 14 mg/day patch for 6 weeks, followed by the 7 mg/day patch for 2 weeks.    rOPINIRole (REQUIP) 0.5 mg, Nightly    tamsulosin (FLOMAX) 0.4 mg, oral, Daily    timolol (Timoptic) 0.5 % ophthalmic solution 1 drop, 2 times daily    traZODone (DESYREL) 50 mg, Nightly PRN      Last recorded vital:  virtual    Physical exam  virtual    Pertinent  "labs:  No results found for: \"PR1\", \"PSA\", \"TESTOST\", \"CMPLASABS\"    Dx:  Problem List Items Addressed This Visit       Prostate cancer (Multi)    Relevant Orders    Clinic Appointment Request Follow Up; [facility]; [facility] (Completed)     Other Visit Diagnoses         Malignant neoplasm of prostate (Multi)    -  Primary    Relevant Orders    Prostate Specific Antigen    Testosterone    Clinic Appointment Request Follow Up; [facility] (virtual); [facility] RADON (virtual)         Review of latent SE including rectal bleeding, hematuria, urinary strictures, ED where reviewed as well as how to contact office if s/s present. Denies latent SE. NCCN guidelines where reviewed and routine FUV of every 3m for first year and every 6m for four years for a total of five years was discussed. Patient verbalized understanding.      Hormonal therapy:  Recommend vitamin D supplementation, start (or increase by) 400-1000 units daily. Reviewed importance of intake while on Testerone lowering therapy as well as after until Testerone re-establishes, at which point may stop if DEXA indicative of normal bone density.   Reviewed national guidelines for calcium intake of 1000 mg a day whether by diet or supplementation. Reviewed importance of intake while on Testerone lowering therapy as well as after until Testerone re-establishes, at which point may stop if DEXA indicative of normal bone density. Also reviewed that individuals at a higher risk such as those over the age of 75 or those with a hx of bone fx, osteoporosis or osteopenia to continue supplementation indefinitely with routine DEXA imaging as per national guidelines to assess bone health continually. Discussed guidelines for 150 minutes of moderate exercise a week or 75 minutes of Vigorous exercise to help control and reduce weight gain.    Constipation:  Discussed step wise approached to constipation including OTC and homeopathic remedies. Discussed at " home measures including adequate fluid 64oz a day or more, adequate fiber intake, adequate fruit and vegetable intake of 2.5 cusps of both fruit and vegetables and lastly the usage of OTC medications including stool softeners (docusate/colace) daily to relieve symptoms.    BPH: Continue flowmax daily    PLAN:  FUV 3m  Labs Psa and testosterone today and in 3m (had todays done at [facility] [place])  Imaging none  FUV other providers: PCP for routine evals    Please contact office with any concerns:  Tereso Menon AOP  [phone]    I performed this visit using realtime telehealth tools, including an audio/video OR telephone connection between patient’s name and location Paul Malone and Tereso Owen [facility].    2. POS 10: Telehealth provided in patient's home.  o Patient is located in their home (which is a location other than a hospital or other  facility where the patient receives care in a private residence) when receiving  health services or health related services through telecommunication technology.       [1]   Past Medical History:  Diagnosis Date    Arthritis     CAD (coronary artery disease)     Clotting disorder (Multi)     Compression fracture of lumbar vertebra (Multi)     compressed disk fractures, lumbar per patient    COPD (chronic obstructive pulmonary disease) (Multi)     Fatty liver     HTN (hypertension)     Hypercholesteremia     IBS (irritable bowel syndrome)     Presence of other specified devices 04/12/2022    Chronic indwelling Seo catheter    Prostate cancer (Multi)     Pulmonary embolism     RLS (restless legs syndrome)    [2]   Family History  Problem Relation Name Age of Onset    Prostate cancer Brother          Proton RT   [3]   Past Surgical History:  Procedure Laterality Date    CYSTOSCOPY      PROSTATE SURGERY  05/05/2022    HOLEP    SPINE SURGERY      TONSILLECTOMY

## 2025-06-19 ENCOUNTER — HOSPITAL ENCOUNTER (OUTPATIENT)
Dept: RADIATION ONCOLOGY | Facility: HOSPITAL | Age: 70
Setting detail: RADIATION/ONCOLOGY SERIES
Discharge: HOME | End: 2025-06-19
Payer: COMMERCIAL

## 2025-06-19 DIAGNOSIS — C61 PROSTATE CANCER (MULTI): ICD-10-CM

## 2025-06-19 DIAGNOSIS — K59.00 CONSTIPATION, UNSPECIFIED CONSTIPATION TYPE: ICD-10-CM

## 2025-06-19 DIAGNOSIS — C61 MALIGNANT NEOPLASM OF PROSTATE (MULTI): Primary | ICD-10-CM

## 2025-06-19 PROCEDURE — 99213 OFFICE O/P EST LOW 20 MIN: CPT

## 2025-06-19 PROCEDURE — 99213 OFFICE O/P EST LOW 20 MIN: CPT | Mod: 95

## 2025-06-19 ASSESSMENT — ENCOUNTER SYMPTOMS
CHEST TIGHTNESS: 0
PALPITATIONS: 0
DIFFICULTY URINATING: 1
BACK PAIN: 0
CONSTIPATION: 0
DIARRHEA: 0
PSYCHIATRIC NEGATIVE: 1
FREQUENCY: 1
HEMATURIA: 0
ABDOMINAL PAIN: 0
ANAL BLEEDING: 0
WEAKNESS: 0
JOINT SWELLING: 0
FATIGUE: 0
COUGH: 0
ARTHRALGIAS: 0
DIZZINESS: 0
DYSURIA: 0
ALLERGIC/IMMUNOLOGIC NEGATIVE: 1
FEVER: 0
BLOOD IN STOOL: 0
SHORTNESS OF BREATH: 0
UNEXPECTED WEIGHT CHANGE: 0
RECTAL PAIN: 0

## 2025-06-24 ENCOUNTER — TELEPHONE (OUTPATIENT)
Dept: RADIATION ONCOLOGY | Facility: HOSPITAL | Age: 70
End: 2025-06-24
Payer: COMMERCIAL

## 2025-06-24 NOTE — TELEPHONE ENCOUNTER
Patient wanting to know if you received his blood work PSA records from [facility] in [place], [place] ph. [phone].  He wants a call back to go over them.  I asked the lab to fax them to me directly in case you did not receive them and will reply back here once received and uploaded in chart.  Please call the pt.  Please advise.

## (undated) DEVICE — DRESSING, GAUZE, SPONGE, 12 PLY, 4 X 4 IN, PLASTIC POUCH, STRL 10PK

## (undated) DEVICE — SYRINGE, 10 CC, LUER LOCK

## (undated) DEVICE — NEEDLE, HYPODERMIC, REGULAR WALL, REGULAR BEVEL, 18 G X 1.5 IN

## (undated) DEVICE — NEEDLE, SPINAL, QUINCKE, 22 G X 7 IN, BLACK HUB

## (undated) DEVICE — DRESSING, TELFA, 3X4

## (undated) DEVICE — SYRINGE, TOOMEY, IRRIGATION, 60ML, INDIVIDUAL WRAP, STERILE

## (undated) DEVICE — GLOVE, SURGICAL, PROTEXIS PI , 7.5, PF, LF

## (undated) DEVICE — GOWN, SURGICAL, SIRUS, NON REINFORCED, LARGE

## (undated) DEVICE — SYSTEM, SPACEOAR VUE, HYDROGEL

## (undated) DEVICE — APPLICATOR, CHLORAPREP, W/ORANGE TINT, 26ML

## (undated) DEVICE — SOLUTION, IRRIGATION, X RX SODIUM CHL 0.9%, 1000ML BTL

## (undated) DEVICE — BLANKET, LOWER BODY, VHA PLUS, ADULT

## (undated) DEVICE — TOWEL, SURGICAL, NEURO, O/R, 16 X 26, BLUE, STERILE

## (undated) DEVICE — SYRINGE, TOOMEY, IRRIGATION, 70ML

## (undated) DEVICE — Device

## (undated) DEVICE — MARKER, SKIN, REGULAR TIP, W/W/FLEXI RULER, LABEL

## (undated) DEVICE — DRESSING, NON-ADHERENT, TELFA, OUCHLESS, 3 X 8 IN, STERILE